# Patient Record
Sex: FEMALE | Race: WHITE | Employment: UNEMPLOYED | URBAN - METROPOLITAN AREA
[De-identification: names, ages, dates, MRNs, and addresses within clinical notes are randomized per-mention and may not be internally consistent; named-entity substitution may affect disease eponyms.]

---

## 2017-02-17 ENCOUNTER — OFFICE VISIT (OUTPATIENT)
Dept: FAMILY MEDICINE CLINIC | Age: 47
End: 2017-02-17

## 2017-02-17 VITALS
BODY MASS INDEX: 23.46 KG/M2 | WEIGHT: 137.4 LBS | TEMPERATURE: 98.5 F | HEART RATE: 56 BPM | HEIGHT: 64 IN | OXYGEN SATURATION: 99 % | DIASTOLIC BLOOD PRESSURE: 68 MMHG | RESPIRATION RATE: 16 BRPM | SYSTOLIC BLOOD PRESSURE: 126 MMHG

## 2017-02-17 DIAGNOSIS — Z00.00 ROUTINE GENERAL MEDICAL EXAMINATION AT A HEALTH CARE FACILITY: Primary | ICD-10-CM

## 2017-02-17 NOTE — PROGRESS NOTES
Subjective:   48 y.o. female for Well Woman Check. Her gyne and breast care is done elsewhere by her Ob-Gyne physician. Patient Active Problem List    Diagnosis Date Noted    Elevated cholesterol with Good HDL     Migraine, unspecified, without mention of intractable migraine without mention of status migrainosus 2010     Current Outpatient Prescriptions   Medication Sig Dispense Refill    mometasone (ELOCON) 0.1 % lotion Put 3 drops in each ear and swab on to outer ear two times a day 30 mL 1    VITAMIN B COMPLEX (B COMPLEX PO) Take  by mouth.  DOCOSAHEXANOIC ACID/EPA (FISH OIL PO) Take 2.5 g by mouth daily.  tiZANidine (ZANAFLEX) 2 mg tablet Take 1-2 Tabs by mouth nightly as needed. For muscle spasms 30 Tab 1    ranitidine (ZANTAC) 150 mg tablet Take 1 Tab by mouth two (2) times daily as needed for Indigestion. 30 Tab 1     Allergies   Allergen Reactions    Zomig [Zolmitriptan] Swelling     Throat stated swelling-heart racing-sx's passed after 30-45 min     Past Medical History   Diagnosis Date    Anemia 2011    Borderline Abnormal thyroid function test 2011    Elevated cholesterol with Good HDL     Migraine     Miscarriage      SABx1     (normal spontaneous vaginal delivery)      NSVDx2     History reviewed. No pertinent past surgical history.   Family History   Problem Relation Age of Onset    Hypertension Mother      obese s/p lap band surgery    High Cholesterol Father     Heart Attack Father      MI at age 52yo, CABG;  in an MVA    High Cholesterol Sister     Cancer Paternal Grandmother      ?intestinal CA in her [de-identified]    Heart Disease Paternal Grandmother      \"aneurysm\" in the brain she thinks    Cancer Paternal Grandfather      throat CA, heavy smoker, ?63's    Thyroid Disease Sister      hypothyroidism    Seizures Daughter      1 seizure at 20mo old   [facility] Breast Cancer Paternal Aunt      Social History   Substance Use Topics    Smoking status: Never Smoker    Smokeless tobacco: Never Used    Alcohol use Yes      Comment: 2-3 times/week about 1-2 glasses of wine           ROS: Feeling generally well. No TIA's or unusual headaches, no dysphagia. No prolonged cough. No dyspnea or chest pain on exertion. No abdominal pain, change in bowel habits, black or bloody stools. No urinary tract symptoms. No new or unusual musculoskeletal symptoms. Specific concerns today: Pt is a  at [employer] and exercises regularly    Objective: The patient appears well, alert, oriented x 3, in no distress. Visit Vitals    /68 (BP 1 Location: Right arm, BP Patient Position: Sitting)    Pulse (!) 56    Temp 98.5 °F (36.9 °C) (Oral)    Resp 16    Ht 5' 4\" (1.626 m)    Wt 137 lb 6.4 oz (62.3 kg)    LMP 01/29/2017 (Approximate)    SpO2 99%    BMI 23.58 kg/m2     ENT normal.  Neck supple. No adenopathy or thyromegaly. DAVI. Lungs are clear, good air entry, no wheezes, rhonchi or rales. S1 and S2 normal, no murmurs, sinus bradycardia. Abdomen soft without tenderness, guarding, mass or organomegaly. Extremities show no edema, normal peripheral pulses. Neurological is normal, no focal findings. Breast and Pelvic exams are deferred. Assessment/Plan:   Well Woman  routine labs ordered    ICD-10-CM ICD-9-CM    1.  Routine general medical examination at a health care facility Z00.00 V70.0 CBC W/O DIFF      LIPID PANEL      METABOLIC PANEL, COMPREHENSIVE      VITAMIN D, 25 HYDROXY      TSH 3RD GENERATION   await labs  Pt was given an after visit summary which includes diagnosis, current medicines and vital and voiced understanding of treatment plan

## 2017-02-17 NOTE — PROGRESS NOTES
Chief Complaint   Patient presents with    Complete Physical     1. Have you been to the ER, urgent care clinic since your last visit? Hospitalized since your last visit? No    2. Have you seen or consulted any other health care providers outside of the [address] [place] [address] since your last visit? Include any pap smears or colon screening.  No

## 2017-02-17 NOTE — MR AVS SNAPSHOT
Visit Information Date & Time Provider Department Dept. Phone Encounter #  
 2/17/2017  9:00 AM J Luis Reyes  [facility] [phone] [id number] Upcoming Health Maintenance Date Due  
 PAP AKA CERVICAL CYTOLOGY 7/15/2018 DTaP/Tdap/Td series (2 - Td) 1/12/2022 Allergies as of 2/17/2017  Review Complete On: 2/17/2017 By: J Luis Reyes NP Severity Noted Reaction Type Reactions Zomig [Zolmitriptan]  04/13/2010    Swelling Throat stated swelling-heart racing-sx's passed after 30-45 min Current Immunizations  Reviewed on 1/12/2012 Name Date TDAP Vaccine 1/12/2012 Not reviewed this visit You Were Diagnosed With   
  
 Codes Comments Routine general medical examination at a health care facility    -  Primary ICD-10-CM: Z00.00 ICD-9-CM: V70.0 Vitals BP Pulse Temp Resp Height(growth percentile) Weight(growth percentile) 126/68 (BP 1 Location: Right arm, BP Patient Position: Sitting) (!) 56 98.5 °F (36.9 °C) (Oral) 16 5' 4\" (1.626 m) 137 lb 6.4 oz (62.3 kg) LMP SpO2 BMI OB Status Smoking Status 01/29/2017 (Approximate) 99% 23.58 kg/m2 Having regular periods Never Smoker Vitals History BMI and BSA Data Body Mass Index Body Surface Area  
 23.58 kg/m 2 1.68 m 2 Preferred Pharmacy Pharmacy Name Phone [id number] [place] [address] [facility] [phone] Your Updated Medication List  
  
   
This list is accurate as of: 2/17/17 10:12 AM.  Always use your most recent med list.  
  
  
  
  
 B COMPLEX PO Take  by mouth. FISH OIL PO Take 2.5 g by mouth daily. mometasone 0.1 % lotion Commonly known as:  Bobbette Promise Put 3 drops in each ear and swab on to outer ear two times a day  
  
 raNITIdine 150 mg tablet Commonly known as:  ZANTAC Take 1 Tab by mouth two (2) times daily as needed for Indigestion. tiZANidine 2 mg tablet Commonly known as:  Moriah Crape Take 1-2 Tabs by mouth nightly as needed. For muscle spasms We Performed the Following CBC W/O DIFF [71726 CPT(R)] LIPID PANEL [44320 CPT(R)] METABOLIC PANEL, COMPREHENSIVE [67408 CPT(R)] TSH 3RD GENERATION [28569 CPT(R)] VITAMIN D, 25 HYDROXY [id number] CPT(R)] Introducing [facility]! Ruth Melissa introduces [employer] patient portal. Now you can access parts of your medical record, email your doctor's office, and request medication refills online. 1. In your internet browser, go to [email or web address] 2. Click on the First Time User? Click Here link in the Sign In box. You will see the New Member Sign Up page. 3. Enter your [employer] Access Code exactly as it appears below. You will not need to use this code after youve completed the sign-up process. If you do not sign up before the expiration date, you must request a new code. · [employer] Access Code: [email or web address] Expires: 5/18/2017 10:12 AM 
 
4. Enter the last four digits of your Social Security Number (xxxx) and Date of Birth (mm/dd/yyyy) as indicated and click Submit. You will be taken to the next sign-up page. 5. Create a [employer] ID. This will be your [employer] login ID and cannot be changed, so think of one that is secure and easy to remember. 6. Create a [employer] password. You can change your password at any time. 7. Enter your Password Reset Question and Answer. This can be used at a later time if you forget your password. 8. Enter your e-mail address. You will receive e-mail notification when new information is available in [address] 9. Click Sign Up. You can now view and download portions of your medical record. 10. Click the Download Summary menu link to download a portable copy of your medical information.  
 
If you have questions, please visit the Frequently Asked Questions section of the RegisterPatient. Remember, [employer] is NOT to be used for urgent needs. For medical emergencies, dial 911. Now available from your iPhone and Android! Please provide this summary of care documentation to your next provider. Your primary care clinician is listed as REGINA HANSON. If you have any questions after today's visit, please call [phone].

## 2017-02-18 LAB
25(OH)D3+25(OH)D2 SERPL-MCNC: 34.4 NG/ML (ref 30–100)
ALBUMIN SERPL-MCNC: 4.4 G/DL (ref 3.5–5.5)
ALBUMIN/GLOB SERPL: 1.8 {RATIO} (ref 1.1–2.5)
ALP SERPL-CCNC: 45 IU/L (ref 39–117)
ALT SERPL-CCNC: 14 IU/L (ref 0–32)
AST SERPL-CCNC: 20 IU/L (ref 0–40)
BILIRUB SERPL-MCNC: 0.3 MG/DL (ref 0–1.2)
BUN SERPL-MCNC: 15 MG/DL (ref 6–24)
BUN/CREAT SERPL: 16 (ref 9–23)
CALCIUM SERPL-MCNC: 9 MG/DL (ref 8.7–10.2)
CHLORIDE SERPL-SCNC: 102 MMOL/L (ref 96–106)
CHOLEST SERPL-MCNC: 262 MG/DL (ref 100–199)
CO2 SERPL-SCNC: 22 MMOL/L (ref 18–29)
CREAT SERPL-MCNC: 0.95 MG/DL (ref 0.57–1)
ERYTHROCYTE [DISTWIDTH] IN BLOOD BY AUTOMATED COUNT: 15.4 % (ref 12.3–15.4)
GLOBULIN SER CALC-MCNC: 2.4 G/DL (ref 1.5–4.5)
GLUCOSE SERPL-MCNC: 85 MG/DL (ref 65–99)
HCT VFR BLD AUTO: 34.1 % (ref 34–46.6)
HDLC SERPL-MCNC: 90 MG/DL
HGB BLD-MCNC: 11.3 G/DL (ref 11.1–15.9)
INTERPRETATION, 910389: NORMAL
LDLC SERPL CALC-MCNC: 158 MG/DL (ref 0–99)
MCH RBC QN AUTO: 27.5 PG (ref 26.6–33)
MCHC RBC AUTO-ENTMCNC: 33.1 G/DL (ref 31.5–35.7)
MCV RBC AUTO: 83 FL (ref 79–97)
PLATELET # BLD AUTO: 272 X10E3/UL (ref 150–379)
POTASSIUM SERPL-SCNC: 4.2 MMOL/L (ref 3.5–5.2)
PROT SERPL-MCNC: 6.8 G/DL (ref 6–8.5)
RBC # BLD AUTO: 4.11 X10E6/UL (ref 3.77–5.28)
SODIUM SERPL-SCNC: 141 MMOL/L (ref 134–144)
TRIGL SERPL-MCNC: 68 MG/DL (ref 0–149)
TSH SERPL DL<=0.005 MIU/L-ACNC: 4.02 UIU/ML (ref 0.45–4.5)
VLDLC SERPL CALC-MCNC: 14 MG/DL (ref 5–40)
WBC # BLD AUTO: 6.1 X10E3/UL (ref 3.4–10.8)

## 2018-02-21 ENCOUNTER — OFFICE VISIT (OUTPATIENT)
Dept: FAMILY MEDICINE CLINIC | Age: 48
End: 2018-02-21

## 2018-02-21 VITALS
SYSTOLIC BLOOD PRESSURE: 112 MMHG | DIASTOLIC BLOOD PRESSURE: 76 MMHG | OXYGEN SATURATION: 98 % | BODY MASS INDEX: 22.26 KG/M2 | WEIGHT: 130.4 LBS | RESPIRATION RATE: 16 BRPM | TEMPERATURE: 99.2 F | HEIGHT: 64 IN | HEART RATE: 84 BPM

## 2018-02-21 DIAGNOSIS — J11.1 INFLUENZA: Primary | ICD-10-CM

## 2018-02-21 LAB
QUICKVUE INFLUENZA TEST: POSITIVE
S PYO AG THROAT QL: NEGATIVE
VALID INTERNAL CONTROL?: YES
VALID INTERNAL CONTROL?: YES

## 2018-02-21 NOTE — PROGRESS NOTES
HISTORY OF PRESENT ILLNESS  Leilani Montalvo is a 50 y.o. female. HPI: Patient reports her symptoms started with chills, fever, sore throat ,cough and body aches x 4 days ago. Her emp was max 101, taking Advil and resting, doesn't have much appetite. Her son is  Diagnosed with flu. Patient doesn't want Tamiflu. Past Medical History:   Diagnosis Date    Anemia 2011    Borderline Abnormal thyroid function test 2011    Elevated cholesterol with Good HDL     Migraine 2007    Miscarriage     SABx1     (normal spontaneous vaginal delivery)     NSVDx2     Allergies   Allergen Reactions    Zomig [Zolmitriptan] Swelling     Throat stated swelling-heart racing-sx's passed after 30-45 min       Current Outpatient Prescriptions:     mometasone (ELOCON) 0.1 % lotion, Put 3 drops in each ear and swab on to outer ear two times a day, Disp: 30 mL, Rfl: 1    VITAMIN B COMPLEX (B COMPLEX PO), Take  by mouth., Disp: , Rfl:     DOCOSAHEXANOIC ACID/EPA (FISH OIL PO), Take 2.5 g by mouth daily. , Disp: , Rfl:     tiZANidine (ZANAFLEX) 2 mg tablet, Take 1-2 Tabs by mouth nightly as needed. For muscle spasms, Disp: 30 Tab, Rfl: 1    ranitidine (ZANTAC) 150 mg tablet, Take 1 Tab by mouth two (2) times daily as needed for Indigestion. , Disp: 30 Tab, Rfl: 1  Review of Systems   Constitutional: Positive for chills, fever and malaise/fatigue. HENT: Negative. Respiratory: Positive for cough. Cardiovascular: Negative. Gastrointestinal: Negative. Blood pressure 112/76, pulse 84, temperature 99.2 °F (37.3 °C), temperature source Oral, resp. rate 16, height 5' 4\" (1.626 m), weight 130 lb 6.4 oz (59.1 kg), last menstrual period 2018, SpO2 98 %. Physical Exam   Constitutional: No distress. HENT:   Mouth/Throat: Oropharynx is clear and moist.   Strep test is negative   Neck: Normal range of motion. Neck supple. Cardiovascular: Normal rate and regular rhythm. No murmur heard.   Pulmonary/Chest: Effort normal and breath sounds normal.   Flu test is positive   Abdominal: Soft. Bowel sounds are normal.   Nursing note and vitals reviewed. ASSESSMENT and PLAN  Diagnoses and all orders for this visit:    1.  Influenza  -     AMB POC RAPID INFLUENZA TEST  -     AMB POC RAPID STREP A  Advised to continue with Advil, tylenol  Rest, fluid, OTC cough medication  Call if not improved  Pt was given an after visit summary which includes diagnosis, current medicines and vital and voiced understanding of treatment plan

## 2018-02-21 NOTE — LETTER
NOTIFICATION RETURN TO WORK / SCHOOL 
 
2/21/2018 11:46 AM 
 
Ms. Kanwal Fung [address] [place] [address] [phone] To Whom It May Concern: 
 
Kanwal Fung is currently under the care of [facility]. She will return to work on 2/26/2018 If there are questions or concerns please have the patient contact our office. Sincerely, Pepe Llanes NP

## 2018-02-21 NOTE — PATIENT INSTRUCTIONS

## 2018-02-21 NOTE — MR AVS SNAPSHOT
[facility] 
 
 
 [address] 
[phone] Patient: Makenzie Segundo MRN: [id number]:[id number] Visit Information Date & Time Provider Department Dept. Phone Encounter #  
 2/21/2018 11:15 AM Sonali Michaels, [facility] [phone] [id number] Upcoming Health Maintenance Date Due  
 PAP AKA CERVICAL CYTOLOGY 7/15/2018 DTaP/Tdap/Td series (2 - Td) 1/12/2022 Allergies as of 2/21/2018  Review Complete On: 2/21/2018 By: Sonali Michaels NP Severity Noted Reaction Type Reactions Zomig [Zolmitriptan]  04/13/2010    Swelling Throat stated swelling-heart racing-sx's passed after 30-45 min Current Immunizations  Reviewed on 1/12/2012 Name Date TDAP Vaccine 1/12/2012 Not reviewed this visit You Were Diagnosed With   
  
 Codes Comments Flu-like symptoms    -  Primary ICD-10-CM: R68.89 ICD-9-CM: 780.99 Influenza     ICD-10-CM: J11.1 ICD-9-CM: 487. 1 Vitals BP Pulse Temp Resp Height(growth percentile) Weight(growth percentile) 112/76 (BP 1 Location: Left arm, BP Patient Position: Sitting) 84 99.2 °F (37.3 °C) (Oral) 16 5' 4\" (1.626 m) 130 lb 6.4 oz (59.1 kg) LMP SpO2 BMI OB Status Smoking Status 02/07/2018 98% 22.38 kg/m2 Having regular periods Never Smoker Vitals History BMI and BSA Data Body Mass Index Body Surface Area  
 22.38 kg/m 2 1.63 m 2 Preferred Pharmacy Pharmacy Name Phone [id number] [place] [address] [facility] [phone] Your Updated Medication List  
  
   
This list is accurate as of 2/21/18 11:50 AM.  Always use your most recent med list.  
  
  
  
  
 B COMPLEX PO Take  by mouth. FISH OIL PO Take 2.5 g by mouth daily. mometasone 0.1 % lotion Commonly known as:  Daphne Raygoza  
 Put 3 drops in each ear and swab on to outer ear two times a day  
  
 raNITIdine 150 mg tablet Commonly known as:  ZANTAC Take 1 Tab by mouth two (2) times daily as needed for Indigestion. tiZANidine 2 mg tablet Commonly known as:  Carter Grew Take 1-2 Tabs by mouth nightly as needed. For muscle spasms We Performed the Following AMB POC RAPID INFLUENZA TEST [21631 CPT(R)] AMB POC RAPID STREP A [06719 CPT(R)] Patient Instructions Influenza (Flu): Care Instructions Your Care Instructions Influenza (flu) is an infection in the lungs and breathing passages. It is caused by the influenza virus. There are different strains, or types, of the flu virus from year to year. Unlike the common cold, the flu comes on suddenly and the symptoms, such as a cough, congestion, fever, chills, fatigue, aches, and pains, are more severe. These symptoms may last up to 10 days. Although the flu can make you feel very sick, it usually doesn't cause serious health problems. Home treatment is usually all you need for flu symptoms. But your doctor may prescribe antiviral medicine to prevent other health problems, such as pneumonia, from developing. Older people and those who have a long-term health condition, such as lung disease, are most at risk for having pneumonia or other health problems. Follow-up care is a key part of your treatment and safety. Be sure to make and go to all appointments, and call your doctor if you are having problems. It's also a good idea to know your test results and keep a list of the medicines you take. How can you care for yourself at home? · Get plenty of rest. 
· Drink plenty of fluids, enough so that your urine is light yellow or clear like water. If you have kidney, heart, or liver disease and have to limit fluids, talk with your doctor before you increase the amount of fluids you drink. · Take an over-the-counter pain medicine if needed, such as acetaminophen (Tylenol), ibuprofen (Advil, Motrin), or naproxen (Aleve), to relieve fever, headache, and muscle aches. Read and follow all instructions on the label. No one younger than 20 should take aspirin. It has been linked to Reye syndrome, a serious illness. · Do not smoke. Smoking can make the flu worse. If you need help quitting, talk to your doctor about stop-smoking programs and medicines. These can increase your chances of quitting for good. · Breathe moist air from a hot shower or from a sink filled with hot water to help clear a stuffy nose. · Before you use cough and cold medicines, check the label. These medicines may not be safe for young children or for people with certain health problems. · If the skin around your nose and lips becomes sore, put some petroleum jelly on the area. · To ease coughing: ¨ Drink fluids to soothe a scratchy throat. ¨ Suck on cough drops or plain hard candy. ¨ Take an over-the-counter cough medicine that contains dextromethorphan to help you get some sleep. Read and follow all instructions on the label. ¨ Raise your head at night with an extra pillow. This may help you rest if coughing keeps you awake. · Take any prescribed medicine exactly as directed. Call your doctor if you think you are having a problem with your medicine. To avoid spreading the flu · Wash your hands regularly, and keep your hands away from your face. · Stay home from school, work, and other public places until you are feeling better and your fever has been gone for at least 24 hours. The fever needs to have gone away on its own without the help of medicine. · Ask people living with you to talk to their doctors about preventing the flu. They may get antiviral medicine to keep from getting the flu from you. · To prevent the flu in the future, get a flu vaccine every fall. Encourage people living with you to get the vaccine. · Cover your mouth when you cough or sneeze. When should you call for help? Call 911 anytime you think you may need emergency care. For example, call if: 
? · You have severe trouble breathing. ?Call your doctor now or seek immediate medical care if: 
? · You have new or worse trouble breathing. ? · You seem to be getting much sicker. ? · You feel very sleepy or confused. ? · You have a new or higher fever. ? · You get a new rash. ? Watch closely for changes in your health, and be sure to contact your doctor if: 
? · You begin to get better and then get worse. ? · You are not getting better after 1 week. Where can you learn more? Go to http://elaine-radha.info/. Enter G572 in the search box to learn more about \"Influenza (Flu): Care Instructions. \" Current as of: May 12, 2017 Content Version: 11.4 © 4680-8034 [employer]. Care instructions adapted under license by [employer] (which disclaims liability or warranty for this information). If you have questions about a medical condition or this instruction, always ask your healthcare professional. [place] [address] any warranty or liability for your use of this information. Introducing [facility]! Sierra Emanuel introduces [employer] patient portal. Now you can access parts of your medical record, email your doctor's office, and request medication refills online. 1. In your internet browser, go to [email or web address] 2. Click on the First Time User? Click Here link in the Sign In box. You will see the New Member Sign Up page. 3. Enter your [employer] Access Code exactly as it appears below. You will not need to use this code after youve completed the sign-up process. If you do not sign up before the expiration date, you must request a new code. · [employer] Access Code: [email or web address] Expires: 5/22/2018 11:31 AM 
 
 4. Enter the last four digits of your Social Security Number (xxxx) and Date of Birth (mm/dd/yyyy) as indicated and click Submit. You will be taken to the next sign-up page. 5. Create a [employer] ID. This will be your [employer] login ID and cannot be changed, so think of one that is secure and easy to remember. 6. Create a [employer] password. You can change your password at any time. 7. Enter your Password Reset Question and Answer. This can be used at a later time if you forget your password. 8. Enter your e-mail address. You will receive e-mail notification when new information is available in [address] 9. Click Sign Up. You can now view and download portions of your medical record. 10. Click the Download Summary menu link to download a portable copy of your medical information. If you have questions, please visit the Frequently Asked Questions section of the [employer] website. Remember, [employer] is NOT to be used for urgent needs. For medical emergencies, dial 911. Now available from your iPhone and Android! Please provide this summary of care documentation to your next provider. Your primary care clinician is listed as REGINA HANSON. If you have any questions after today's visit, please call [phone].

## 2018-06-25 ENCOUNTER — OFFICE VISIT (OUTPATIENT)
Dept: FAMILY MEDICINE CLINIC | Age: 48
End: 2018-06-25

## 2018-06-25 VITALS
BODY MASS INDEX: 23.39 KG/M2 | OXYGEN SATURATION: 99 % | HEIGHT: 64 IN | TEMPERATURE: 98.5 F | RESPIRATION RATE: 16 BRPM | HEART RATE: 56 BPM | WEIGHT: 137 LBS | DIASTOLIC BLOOD PRESSURE: 81 MMHG | SYSTOLIC BLOOD PRESSURE: 108 MMHG

## 2018-06-25 DIAGNOSIS — R42 DIZZINESS: Primary | ICD-10-CM

## 2018-06-25 DIAGNOSIS — H61.21 IMPACTED CERUMEN OF RIGHT EAR: ICD-10-CM

## 2018-06-25 DIAGNOSIS — H61.21 HEARING LOSS DUE TO CERUMEN IMPACTION, RIGHT: ICD-10-CM

## 2018-06-25 RX ORDER — MECLIZINE HYDROCHLORIDE 25 MG/1
25 TABLET ORAL
Qty: 30 TAB | Refills: 0 | Status: SHIPPED | OUTPATIENT
Start: 2018-06-25 | End: 2018-07-05

## 2018-06-25 RX ORDER — METHYLPREDNISOLONE 4 MG/1
TABLET ORAL
Qty: 1 DOSE PACK | Refills: 0 | Status: SHIPPED | OUTPATIENT
Start: 2018-06-25 | End: 2019-05-22 | Stop reason: ALTCHOICE

## 2018-06-25 NOTE — MR AVS SNAPSHOT
[address] 
[phone] Patient: Betito Blackwood MRN: [id number] OCD:4/3/3694 Visit Information Date & Time Provider Department Dept. Phone Encounter #  
 6/25/2018 11:45 AM Ermias Dumas, [address] [facility] [phone] [id number] Upcoming Health Maintenance Date Due  
 PAP AKA CERVICAL CYTOLOGY 7/15/2018 Influenza Age 5 to Adult 8/1/2018 DTaP/Tdap/Td series (2 - Td) 1/12/2022 Allergies as of 6/25/2018  Review Complete On: 6/25/2018 By: Ermias Dumas NP Severity Noted Reaction Type Reactions Zomig [Zolmitriptan]  04/13/2010    Swelling Throat stated swelling-heart racing-sx's passed after 30-45 min Current Immunizations  Reviewed on 1/12/2012 Name Date TDAP Vaccine 1/12/2012 Not reviewed this visit You Were Diagnosed With   
  
 Codes Comments Dizziness    -  Primary ICD-10-CM: D95 ICD-9-CM: 780.4 Hearing loss due to cerumen impaction, right     ICD-10-CM: H61.21 ICD-9-CM: 389.8, 380.4 Vitals BP Pulse Temp Resp Height(growth percentile) Weight(growth percentile) 98/62 (!) 56 98.5 °F (36.9 °C) (Oral) 16 5' 4\" (1.626 m) 137 lb (62.1 kg) LMP SpO2 BMI OB Status Smoking Status 06/02/2018 99% 23.52 kg/m2 Having regular periods Never Smoker Vitals History BMI and BSA Data Body Mass Index Body Surface Area  
 23.52 kg/m 2 1.67 m 2 Preferred Pharmacy Pharmacy Name Phone [id number] [place], [address] [facility] [phone] Your Updated Medication List  
  
   
This list is accurate as of 6/25/18 12:27 PM.  Always use your most recent med list.  
  
  
  
  
 B COMPLEX PO Take  by mouth. FISH OIL PO Take 2.5 g by mouth daily. meclizine 25 mg tablet Commonly known as:  ANTIVERT  
 Take 1 Tab by mouth three (3) times daily as needed for up to 10 days. methylPREDNISolone 4 mg tablet Commonly known as:  Champ Milton Use as directed  
  
 mometasone 0.1 % lotion Commonly known as:  Brendolyjuan Arbour Put 3 drops in each ear and swab on to outer ear two times a day  
  
 raNITIdine 150 mg tablet Commonly known as:  ZANTAC Take 1 Tab by mouth two (2) times daily as needed for Indigestion. tiZANidine 2 mg tablet Commonly known as:  Ibrahim Sayres Take 1-2 Tabs by mouth nightly as needed. For muscle spasms Prescriptions Sent to Pharmacy Refills  
 methylPREDNISolone (MEDROL DOSEPACK) 4 mg tablet 0 Sig: Use as directed Class: Normal  
 Pharmacy: [id number] [place] [id number] [facility]: [phone]  
 meclizine (ANTIVERT) 25 mg tablet 0 Sig: Take 1 Tab by mouth three (3) times daily as needed for up to 10 days. Class: Normal  
 Pharmacy: [id number]- [place] [address] [address] [facility] Ph #: [phone] Route: Oral  
  
We Performed the Following REMOVE IMPACTED EAR WAX [79824 CPT(R)] Introducing [facility] SERVICES! Tariq Correia introduces [employer] patient portal. Now you can access parts of your medical record, email your doctor's office, and request medication refills online. 1. In your internet browser, go to [email or web address]. [email or web address]/[employer] 2. Click on the First Time User? Click Here link in the Sign In box. You will see the New Member Sign Up page. 3. Enter your [employer] Access Code exactly as it appears below. You will not need to use this code after youve completed the sign-up process. If you do not sign up before the expiration date, you must request a new code. · [employer] Access Code: [email or web address] Expires: 9/23/2018 11:51 AM 
 
4.  Enter the last four digits of your Social Security Number (xxxx) and Date of Birth (mm/dd/yyyy) as indicated and click Submit. You will be taken to the next sign-up page. 5. Create a [employer] ID. This will be your [employer] login ID and cannot be changed, so think of one that is secure and easy to remember. 6. Create a [employer] password. You can change your password at any time. 7. Enter your Password Reset Question and Answer. This can be used at a later time if you forget your password. 8. Enter your e-mail address. You will receive e-mail notification when new information is available in [address] 9. Click Sign Up. You can now view and download portions of your medical record. 10. Click the Download Summary menu link to download a portable copy of your medical information. If you have questions, please visit the Frequently Asked Questions section of the [employer] website. Remember, [employer] is NOT to be used for urgent needs. For medical emergencies, dial 911. Now available from your iPhone and Android! Please provide this summary of care documentation to your next provider. Your primary care clinician is listed as REGINA HANSON. If you have any questions after today's visit, please call [phone].

## 2018-06-25 NOTE — PROGRESS NOTES
Chief Complaint   Patient presents with    Hearing Problem     right ear feels totally clogged.  Dizziness     Since 2/2018     1. Have you been to the ER, urgent care clinic since your last visit? Hospitalized since your last visit? No    2. Have you seen or consulted any other health care providers outside of the [facility] since your last visit? Include any pap smears or colon screening.  No

## 2018-06-25 NOTE — PROGRESS NOTES
HISTORY OF PRESENT ILLNESS  Alannah Kelsey is a 50 y.o. female. HPI: Patient complaints of dizziness, and fullness in ears since last feb, after having flu. dizziness is worse with changing position. She is unable to hear for right ear. Denies URI  Past Medical History:   Diagnosis Date    Anemia 2011    Borderline Abnormal thyroid function test 2011    Elevated cholesterol with Good HDL     Migraine 2007    Miscarriage     SABx1     (normal spontaneous vaginal delivery)     NSVDx2   History reviewed. No pertinent surgical history. Allergies   Allergen Reactions    Zomig [Zolmitriptan] Swelling     Throat stated swelling-heart racing-sx's passed after 30-45 min     Current Outpatient Prescriptions:     methylPREDNISolone (MEDROL DOSEPACK) 4 mg tablet, Use as directed, Disp: 1 Dose Pack, Rfl: 0    meclizine (ANTIVERT) 25 mg tablet, Take 1 Tab by mouth three (3) times daily as needed for up to 10 days. , Disp: 30 Tab, Rfl: 0    mometasone (ELOCON) 0.1 % lotion, Put 3 drops in each ear and swab on to outer ear two times a day, Disp: 30 mL, Rfl: 1    VITAMIN B COMPLEX (B COMPLEX PO), Take  by mouth., Disp: , Rfl:     DOCOSAHEXANOIC ACID/EPA (FISH OIL PO), Take 2.5 g by mouth daily. , Disp: , Rfl:     tiZANidine (ZANAFLEX) 2 mg tablet, Take 1-2 Tabs by mouth nightly as needed. For muscle spasms, Disp: 30 Tab, Rfl: 1    ranitidine (ZANTAC) 150 mg tablet, Take 1 Tab by mouth two (2) times daily as needed for Indigestion. , Disp: 30 Tab, Rfl: 1  Review of Systems   Constitutional: Negative. HENT: Positive for hearing loss. Respiratory: Negative. Cardiovascular: Negative. Gastrointestinal: Negative. Neurological: Positive for dizziness. Blood pressure 108/81, pulse (!) 56, temperature 98.5 °F (36.9 °C), temperature source Oral, resp. rate 16, height 5' 4\" (1.626 m), weight 137 lb (62.1 kg), last menstrual period 2018, SpO2 99 %.     Physical Exam   Constitutional: No distress. HENT:   Right ear cerumen impaction  Lt left is dull, no LR   Neck: Normal range of motion. Neck supple. Cardiovascular: Normal rate and regular rhythm. No murmur heard. Pulmonary/Chest: Effort normal and breath sounds normal.   Abdominal: Soft. Bowel sounds are normal.   Nursing note and vitals reviewed. ASSESSMENT and PLAN  Diagnoses and all orders for this visit:    1. Dizziness  -     methylPREDNISolone (MEDROL DOSEPACK) 4 mg tablet; Use as directed  -     meclizine (ANTIVERT) 25 mg tablet; Take 1 Tab by mouth three (3) times daily as needed for up to 10 days. 2. Hearing loss due to cerumen impaction, right       Able to ear post irrigation  3.  Impacted cerumen of right ear  -     REMOVE IMPACTED EAR WAX  Follow up if not improved  Pt was given an after visit summary which includes diagnosis, current medicines and vital and voiced understanding of treatment plan

## 2019-05-22 ENCOUNTER — OFFICE VISIT (OUTPATIENT)
Dept: FAMILY MEDICINE CLINIC | Age: 49
End: 2019-05-22

## 2019-05-22 VITALS
TEMPERATURE: 98.5 F | OXYGEN SATURATION: 100 % | WEIGHT: 136 LBS | HEART RATE: 56 BPM | BODY MASS INDEX: 23.22 KG/M2 | HEIGHT: 64 IN | DIASTOLIC BLOOD PRESSURE: 74 MMHG | SYSTOLIC BLOOD PRESSURE: 122 MMHG | RESPIRATION RATE: 16 BRPM

## 2019-05-22 DIAGNOSIS — H61.21 HEARING LOSS DUE TO CERUMEN IMPACTION, RIGHT: Primary | ICD-10-CM

## 2019-05-22 DIAGNOSIS — N92.6 IRREGULAR PERIODS: ICD-10-CM

## 2019-05-22 NOTE — PROGRESS NOTES
HISTORY OF PRESENT ILLNESS  [place] Marie is a 46 y.o. female. HPI: Patient is complaining right ear fullness with hearing loss that started gradually. Denies pain or dizzness. She has irregular periods, believes that she is going through menopause. Past Medical History:   Diagnosis Date    Anemia 2011    Borderline Abnormal thyroid function test 2011    Elevated cholesterol with Good HDL     Migraine 2007    Miscarriage     SABx1     (normal spontaneous vaginal delivery)     NSVDx2   History reviewed. No pertinent surgical history. Allergies   Allergen Reactions    Zomig [Zolmitriptan] Swelling     Throat stated swelling-heart racing-sx's passed after 30-45 min     Current Outpatient Medications:     DOCOSAHEXANOIC ACID/EPA (FISH OIL PO), Take 2.5 g by mouth daily. , Disp: , Rfl:     tiZANidine (ZANAFLEX) 2 mg tablet, Take 1-2 Tabs by mouth nightly as needed. For muscle spasms, Disp: 30 Tab, Rfl: 1    ranitidine (ZANTAC) 150 mg tablet, Take 1 Tab by mouth two (2) times daily as needed for Indigestion. , Disp: 30 Tab, Rfl: 1    mometasone (ELOCON) 0.1 % lotion, Put 3 drops in each ear and swab on to outer ear two times a day, Disp: 30 mL, Rfl: 1    VITAMIN B COMPLEX (B COMPLEX PO), Take  by mouth., Disp: , Rfl:   Review of Systems   Constitutional: Negative. HENT: Positive for hearing loss. Negative for nosebleeds. Respiratory: Negative. Cardiovascular: Negative. Gastrointestinal: Negative. Blood pressure 122/74, pulse (!) 56, temperature 98.5 °F (36.9 °C), temperature source Oral, resp. rate 16, height 5' 4\" (1.626 m), weight 136 lb (61.7 kg), last menstrual period 05/10/2019, SpO2 100 %. Physical Exam   Constitutional: No distress. HENT:   Left Ear: External ear normal.   Mouth/Throat: Oropharynx is clear and moist.   Right ear partial cerumen impaction   Neck: Normal range of motion. Neck supple. Cardiovascular: Normal rate and regular rhythm.    No murmur heard. Pulmonary/Chest: Effort normal and breath sounds normal.   Abdominal: Soft. Bowel sounds are normal.   Nursing note and vitals reviewed. ASSESSMENT and PLAN  Diagnoses and all orders for this visit:    1. Hearing loss due to cerumen impaction, right  -     REMOVE IMPACTED EAR WAX,         Irrigation didn'tnot helped much with hearing loss  -     REFERRAL TO ENT-OTOLARYNGOLOGY          2.  Irregular periods      Advised she will postmenopause if not having periods for a year  Follow up for physical

## 2019-05-22 NOTE — PROGRESS NOTES
Chief Complaint   Patient presents with    Ear Fullness     pt states her right ear needs looked at.   decreased hearing for over a year.  Irregular Menses     pt wants to discuss menopause symptoms. \"REVIEWED RECORD IN PREPARATION FOR VISIT AND HAVE OBTAINED THE NECESSARY DOCUMENTATION\"  1. Have you been to the ER, urgent care clinic since your last visit? Hospitalized since your last visit? No    2. Have you seen or consulted any other health care providers outside of the [address] [place] [address] since your last visit? Include any pap smears or colon screening.  No

## 2020-06-17 ENCOUNTER — OFFICE VISIT (OUTPATIENT)
Dept: PRIMARY CARE CLINIC | Age: 50
End: 2020-06-17

## 2020-06-17 VITALS — OXYGEN SATURATION: 99 % | HEART RATE: 53 BPM | TEMPERATURE: 98.4 F

## 2020-06-17 DIAGNOSIS — R07.89 CHEST TIGHTNESS: Primary | ICD-10-CM

## 2020-06-17 DIAGNOSIS — Z20.822 EXPOSURE TO COVID-19 VIRUS: ICD-10-CM

## 2020-06-17 NOTE — PROGRESS NOTES
Patient is being seen at the [address] [place] [address]. Please see scanned documentation as well for further information. Patient consented for treatment. S:  Ms. Ida Rivas presents for Covid testing. Patient reports current Covid type symptoms. Tightness in chest and Post nasal drip/ clearing mucous from throat x 2 days. Daughter had direct exposure with positive covid 23 person and has had fever this weekend- daughter's test from [facility] is pending. Patient helped move her daughter home from college Friday. Patient denies additional symptoms. O:    Visit Vitals  Pulse (!) 53   Temp 98.4 °F (36.9 °C) (Oral)   SpO2 99%     Alert and oriented  No acute distress, no increased work of breathing  Normocephalic, atraumatic  Skin color normal  Calm and cooperative  Voice clear, conversant without shortness of breath  Conjunctiva normal  Lungs cta bilat  Heart RRR. A/P:  Concern for Covid 19      Covid 19 testing performed  Patient understands she will be contacted with the results. Supportive care, isolation and follow up prn with primary care provider discussed.

## 2020-06-19 LAB — SARS-COV-2, NAA: NOT DETECTED

## 2020-08-11 ENCOUNTER — VIRTUAL VISIT (OUTPATIENT)
Dept: FAMILY MEDICINE CLINIC | Age: 50
End: 2020-08-11
Payer: COMMERCIAL

## 2020-08-11 DIAGNOSIS — D50.8 OTHER IRON DEFICIENCY ANEMIA: ICD-10-CM

## 2020-08-11 DIAGNOSIS — G43.011 INTRACTABLE MIGRAINE WITHOUT AURA AND WITH STATUS MIGRAINOSUS: Primary | ICD-10-CM

## 2020-08-11 PROCEDURE — 99212 OFFICE O/P EST SF 10 MIN: CPT | Performed by: NURSE PRACTITIONER

## 2020-08-11 RX ORDER — BUTALBITAL, ACETAMINOPHEN AND CAFFEINE 50; 325; 40 MG/1; MG/1; MG/1
1 TABLET ORAL
Qty: 30 TAB | Refills: 0 | Status: SHIPPED | OUTPATIENT
Start: 2020-08-11 | End: 2020-09-29 | Stop reason: SDUPTHER

## 2020-08-11 NOTE — PROGRESS NOTES
Vivian Jeffers  44 y.o. female  1970  [address]  [id number]     [facility]       Encounter Date: 8/11/2020           Established Patient Visit Note: Hilary Stephenson NP    Reason for Appointment:  Chief Complaint   Patient presents with    Cholesterol Problem    Migraine    Other     to discuss labs done        History of Present Illness:  History provided by patient    Vivian Jeffers is a 44 y.o. female who presents today for migraine headaches. The pain started from behind left eye and spread all over her face and head. head . Denies nausea vomiting. She has tired tylenol, Excedrin migraine without help. She did lab work out side and her iron was low       Review of Systems  Review of Systems   Constitutional: Negative. HENT: Negative. Respiratory: Negative. Cardiovascular: Negative. Gastrointestinal: Negative. Neurological: Positive for headaches. Allergies: Zomig [zolmitriptan]    Medications: (Updated to reflect final medication list after visit)    Current Outpatient Medications:     cholecalciferol, vitamin D3, (VITAMIN D3 PO), Take  by mouth., Disp: , Rfl:     butalbital-acetaminophen-caffeine (FIORICET, ESGIC) -40 mg per tablet, Take 1 Tab by mouth every four (4) hours as needed for Headache., Disp: 30 Tab, Rfl: 0    ferrous sulfate (SLOW FE) 142 mg (45 mg iron) ER tablet, Take 1 tab po daily, Disp: 30 Tab, Rfl: 1    ranitidine (ZANTAC) 150 mg tablet, Take 1 Tab by mouth two (2) times daily as needed for Indigestion. , Disp: 30 Tab, Rfl: 1    mometasone (ELOCON) 0.1 % lotion, Put 3 drops in each ear and swab on to outer ear two times a day, Disp: 30 mL, Rfl: 1    tiZANidine (ZANAFLEX) 2 mg tablet, Take 1-2 Tabs by mouth nightly as needed.  For muscle spasms, Disp: 30 Tab, Rfl: 1    VITAMIN B COMPLEX (B COMPLEX PO), Take  by mouth., Disp: , Rfl:     DOCOSAHEXANOIC ACID/EPA (FISH OIL PO), Take 2.5 g by mouth daily. , Disp: , Rfl:     History  Patient Care Team:  Linda Byrd MD as PCP - General  Wynetta Ganser, NP as PCP - [facility]    Past Medical History: she has a past medical history of Anemia (2011), Borderline Abnormal thyroid function test (2011), Elevated cholesterol with Good HDL, Migraine (), Miscarriage, and  (normal spontaneous vaginal delivery). Past Surgical History: she has no past surgical history on file. Family Medical History: family history includes Breast Cancer in her paternal aunt; Cancer in her paternal grandfather and paternal grandmother; Heart Attack in her father; Heart Disease in her paternal grandmother; High Cholesterol in her father and sister; Hypertension in her mother; Seizures in her daughter; Thyroid Disease in her sister. Social History: she reports that she has never smoked. She has never used smokeless tobacco. She reports current alcohol use. She reports that she does not use drugs. GYN: Dr Kaci Montilla      Objective:   Vital Signs  Unable to obtain vital signs today as patient does not have equipment for this at home    Physical Exam  Constitutional:       Appearance: Normal appearance. HENT:      Nose: Nose normal.      Mouth/Throat:      Mouth: Mucous membranes are moist.   Neck:      Musculoskeletal: Normal range of motion and neck supple. Cardiovascular:      Rate and Rhythm: Normal rate and regular rhythm. Pulses: Normal pulses. Heart sounds: Normal heart sounds. No murmur. Pulmonary:      Effort: Pulmonary effort is normal.      Breath sounds: Normal breath sounds. Abdominal:      General: Bowel sounds are normal.      Palpations: Abdomen is soft. Neurological:      Mental Status: She is alert. Assessment & Plan:    1. Intractable migraine without aura and with status migrainosus    - butalbital-acetaminophen-caffeine (FIORICET, ESGIC) -40 mg per tablet;  Take 1 Tab by mouth every four (4) hours as needed for Headache. Dispense: 30 Tab; Refill: 0    2. Other iron deficiency anemia    - ferrous sulfate (SLOW FE) 142 mg (45 mg iron) ER tablet; Take 1 tab po daily  Dispense: 30 Tab; Refill: 1          I was in the office while conducting this encounter. Consent:  She and/or her healthcare decision maker is aware that this patient-initiated Telehealth encounter is a billable service, with coverage as determined by her insurance carrier. She is aware that she may receive a bill and has provided verbal consent to proceed: Yes    This virtual visit was conducted via [employer]. Pursuant to the emergency declaration under the [facility] waiver authority and the [employer] Act, this Virtual  Visit was conducted to reduce the patient's risk of exposure to COVID-19 and provide continuity of care for an established patient. Services were provided through a video synchronous discussion virtually to substitute for in-person clinic visit. Due to this being a TeleHealth evaluation, many elements of the physical examination are unable to be assessed. Total Time: minutes: 11-20 minutes. I have discussed the diagnosis with the patient and the intended plan as seen in the above orders. The patient has received an after-visit summary along with patient information handout. I have discussed medication side effects and warnings with the patient as well.     Disposition    Follow up for general  medical exam     Ahsan Markham NP

## 2020-08-11 NOTE — PROGRESS NOTES
Chief Complaint   Patient presents with    Cholesterol Problem    Migraine    Other     to discuss labs done      1. Have you been to the ER, urgent care clinic since your last visit? Hospitalized since your last visit? No    2. Have you seen or consulted any other health care providers outside of the [address] [place] [address] since your last visit? Include any pap smears or colon screening.  No

## 2020-08-29 ENCOUNTER — TELEPHONE (OUTPATIENT)
Dept: FAMILY MEDICINE CLINIC | Age: 50
End: 2020-08-29

## 2020-08-29 NOTE — TELEPHONE ENCOUNTER
----- Message from Kelli Frazier sent at 8/25/2020 10:23 AM EDT -----  Regarding: Leidy/Telephone  Contact: [phone]  Caller's first and last name: Ben Ty  Reason for call: Pt requsting call back to reschedule her 8/26/20 in office visit.   Callback required yes/no and why: yes  Best contact number(s): [phone]  Details to clarify the request: n/a

## 2020-09-29 DIAGNOSIS — G43.011 INTRACTABLE MIGRAINE WITHOUT AURA AND WITH STATUS MIGRAINOSUS: ICD-10-CM

## 2020-09-29 RX ORDER — BUTALBITAL, ACETAMINOPHEN AND CAFFEINE 50; 325; 40 MG/1; MG/1; MG/1
1 TABLET ORAL
Qty: 30 TAB | Refills: 0 | Status: SHIPPED | OUTPATIENT
Start: 2020-09-29 | End: 2021-11-08 | Stop reason: SDUPTHER

## 2020-09-29 NOTE — TELEPHONE ENCOUNTER
Chief Complaint   Patient presents with    Medication Refill     Fioricet, Esgic -40 mg tablet      Patient last seen virtually on 8/11/2020 by Surekha Mobley NP.   Sheridan Martin LPN

## 2020-10-04 DIAGNOSIS — D50.8 OTHER IRON DEFICIENCY ANEMIA: ICD-10-CM

## 2020-10-06 RX ORDER — FERROUS SULFATE 137(45) MG
TABLET, EXTENDED RELEASE ORAL
Qty: 60 TAB | Refills: 0 | Status: SHIPPED | OUTPATIENT
Start: 2020-10-06 | End: 2021-05-24 | Stop reason: SDUPTHER

## 2020-11-11 ENCOUNTER — VIRTUAL VISIT (OUTPATIENT)
Dept: FAMILY MEDICINE CLINIC | Age: 50
End: 2020-11-11
Payer: COMMERCIAL

## 2020-11-11 ENCOUNTER — PATIENT MESSAGE (OUTPATIENT)
Dept: FAMILY MEDICINE CLINIC | Age: 50
End: 2020-11-11

## 2020-11-11 DIAGNOSIS — R11.2 NAUSEA AND VOMITING IN ADULT: Primary | ICD-10-CM

## 2020-11-11 DIAGNOSIS — R19.7 DIARRHEA, UNSPECIFIED TYPE: ICD-10-CM

## 2020-11-11 PROCEDURE — 99213 OFFICE O/P EST LOW 20 MIN: CPT | Performed by: NURSE PRACTITIONER

## 2020-11-11 NOTE — PROGRESS NOTES
Chief Complaint   Patient presents with    Dizziness    Nausea     during evening    Diarrhea     1. Have you been to the ER, urgent care clinic since your last visit? Hospitalized since your last visit? No    2. Have you seen or consulted any other health care providers outside of the [address] [place] [address] since your last visit? Include any pap smears or colon screening.  No

## 2020-11-11 NOTE — PROGRESS NOTES
Modesto Champion  44 y.o. female  1970  [address]  [id number]     [facility]       Encounter Date: 11/11/2020           Established Patient Visit Note: Enoch Delgado NP    Reason for Appointment:  Chief Complaint   Patient presents with    Dizziness    Nausea     during evening    Diarrhea       History of Present Illness:  History provided by patient    Modesto Champion is a 44 y.o. female who presents today for nausea, vomiting and diarrhea. She reports that on Monday night she felt dizzy then she had nausea with vomiting and diarrhea. She went to bed and felt  better the next day. On tuesday she only had diarrhea with mild nausea . She took Pepto bismol and ginger tea. Today she denies nausea or vomiting, only had one diarrhea. Denies chills, fever,       Review of Systems  Review of Systems   Constitutional: Negative. HENT: Negative. Respiratory: Negative. Cardiovascular: Negative. Gastrointestinal: Positive for diarrhea, nausea and vomiting. Allergies: Zomig [zolmitriptan]    Medications: (Updated to reflect final medication list after visit)    Current Outpatient Medications:     ferrous sulfate (Slow Fe) 142 mg (45 mg iron) ER tablet, TAKE 1 TABLET BY MOUTH EVERY DAY, Disp: 60 Tab, Rfl: 0    cholecalciferol, vitamin D3, (VITAMIN D3 PO), Take  by mouth., Disp: , Rfl:     butalbital-acetaminophen-caffeine (FIORICET, ESGIC) -40 mg per tablet, Take 1 Tab by mouth every four (4) hours as needed for Headache., Disp: 30 Tab, Rfl: 0    tiZANidine (ZANAFLEX) 2 mg tablet, Take 1-2 Tabs by mouth nightly as needed. For muscle spasms, Disp: 30 Tab, Rfl: 1    ranitidine (ZANTAC) 150 mg tablet, Take 1 Tab by mouth two (2) times daily as needed for Indigestion. , Disp: 30 Tab, Rfl: 1    mometasone (ELOCON) 0.1 % lotion, Put 3 drops in each ear and swab on to outer ear two times a day, Disp: 30 mL, Rfl: 1    VITAMIN B COMPLEX (B COMPLEX PO), Take  by mouth., Disp: , Rfl:     DOCOSAHEXANOIC ACID/EPA (FISH OIL PO), Take 2.5 g by mouth daily. , Disp: , Rfl:     History  Patient Care Team:  [facility]    Past Medical History: she has a past medical history of Anemia (2011), Borderline Abnormal thyroid function test (2011), Elevated cholesterol with Good HDL, Migraine (), Miscarriage, and  (normal spontaneous vaginal delivery). Past Surgical History: she has no past surgical history on file. Family Medical History: family history includes Breast Cancer in her paternal aunt; Cancer in her paternal grandfather and paternal grandmother; Heart Attack in her father; Heart Disease in her paternal grandmother; High Cholesterol in her father and sister; Hypertension in her mother; Seizures in her daughter; Thyroid Disease in her sister. Social History: she reports that she has never smoked. She has never used smokeless tobacco. She reports current alcohol use. She reports that she does not use drugs. GYN: Dr Mary Beach      Objective:   Vital Signs  Unable to obtain vital signs today as patient does not have equipment for this at home    Physical Exam  Constitutional:       Appearance: Normal appearance. She is normal weight. HENT:      Head: Normocephalic. Neck:      Musculoskeletal: Normal range of motion and neck supple. Neurological:      Mental Status: She is alert. Psychiatric:         Mood and Affect: Mood normal.         Thought Content: Thought content normal.         Assessment & Plan:    1. Nausea and vomiting in adult  Drinking Ginger tea    2. Diarrhea, unspecified type  Imodium AD to take 1 tab as needed for diarrhea    Eat rice and yogurt, toast  Drink plenty of room temperature  water       I was in the office while conducting this encounter.     Consent:  She and/or her healthcare decision maker is aware that this patient-initiated Telehealth encounter is a billable service, with coverage as determined by her insurance carrier. She is aware that she may receive a bill and has provided verbal consent to proceed: Yes    This virtual visit was conducted via [employer] me. Pursuant to the emergency declaration under the [facility] waiver authority and the [employer] Act, this Virtual  Visit was conducted to reduce the patient's risk of exposure to COVID-19 and provide continuity of care for an established patient. Services were provided through a video synchronous discussion virtually to substitute for in-person clinic visit. Due to this being a TeleHealth evaluation, many elements of the physical examination are unable to be assessed. Total Time: minutes: 11-20 minutes. I have discussed the diagnosis with the patient and the intended plan as seen in the above orders. The patient has received an after-visit summary along with patient information handout. I have discussed medication side effects and warnings with the patient as well.     Disposition    Follow up for physical    Loco Don NP

## 2020-11-12 ENCOUNTER — TELEPHONE (OUTPATIENT)
Dept: FAMILY MEDICINE CLINIC | Age: 50
End: 2020-11-12

## 2020-11-12 NOTE — TELEPHONE ENCOUNTER
----- Message from [place] sent at 11/12/2020 11:06 AM EST -----  Regarding: Dr. Jeane Crenshaw Message/Vendor Calls    Caller's first and last name: Lyle Florian      Reason for call: Pt symptoms have increased with severe dizziness and nausea. It is now day 4 of her not feeling well.  Requesting a call back to advise pt if she should get a covid test.       Callback required yes/no and why: yes      Best contact number(s):[phone]      Details to clarify the request: [facility]

## 2020-11-12 NOTE — TELEPHONE ENCOUNTER
Spoke with patient , name and  verified. Patient informed me that she is still experiencing dizziness and nausea, advised to go to an urgent care for follow up of symptoms. [facility], [facility], or [facility]. Patient verbalized understanding.   Rojelio Noriega LPN

## 2020-11-12 NOTE — TELEPHONE ENCOUNTER
OUTBOUND CALL to pt advising her to get test for COVID-19 at the [address] [place] [address], or any [facility] or [facility]. Pt would like call back from nurse for clincial advise.     BCB# [phone]

## 2020-11-18 ENCOUNTER — OFFICE VISIT (OUTPATIENT)
Dept: FAMILY MEDICINE CLINIC | Age: 50
End: 2020-11-18
Payer: COMMERCIAL

## 2020-11-18 VITALS
HEIGHT: 64 IN | RESPIRATION RATE: 18 BRPM | HEART RATE: 51 BPM | OXYGEN SATURATION: 100 % | TEMPERATURE: 98 F | DIASTOLIC BLOOD PRESSURE: 66 MMHG | BODY MASS INDEX: 23.73 KG/M2 | SYSTOLIC BLOOD PRESSURE: 113 MMHG | WEIGHT: 139 LBS

## 2020-11-18 DIAGNOSIS — E55.9 VITAMIN D DEFICIENCY: ICD-10-CM

## 2020-11-18 DIAGNOSIS — D50.8 OTHER IRON DEFICIENCY ANEMIA: ICD-10-CM

## 2020-11-18 DIAGNOSIS — Z12.11 ENCOUNTER FOR SCREENING COLONOSCOPY: ICD-10-CM

## 2020-11-18 DIAGNOSIS — Z00.00 GENERAL MEDICAL EXAM: Primary | ICD-10-CM

## 2020-11-18 DIAGNOSIS — Z12.31 VISIT FOR SCREENING MAMMOGRAM: ICD-10-CM

## 2020-11-18 PROCEDURE — 99396 PREV VISIT EST AGE 40-64: CPT | Performed by: NURSE PRACTITIONER

## 2020-11-18 RX ORDER — MECLIZINE HYDROCHLORIDE 25 MG/1
25 TABLET ORAL AS NEEDED
COMMUNITY
Start: 2020-11-12

## 2020-11-18 RX ORDER — ONDANSETRON 4 MG/1
4 TABLET, ORALLY DISINTEGRATING ORAL AS NEEDED
COMMUNITY
Start: 2020-11-12 | End: 2022-02-08

## 2020-11-18 NOTE — PROGRESS NOTES
Chief Complaint   Patient presents with    Complete Physical    Vomiting    Dizziness       1. Have you been to the ER, urgent care clinic since your last visit? Hospitalized since your last visit? Yes When: Nov 12 Where: [facility] Reason for visit: vomiting, dizziness    2. Have you seen or consulted any other health care providers outside of the [address] [place] [address] since your last visit? Include any pap smears or colon screening. No    Abuse Screening Questionnaire 11/18/2020   Do you ever feel afraid of your partner? N   Are you in a relationship with someone who physically or mentally threatens you? N   Is it safe for you to go home?  Y       3 most recent PHQ Screens 11/18/2020   Little interest or pleasure in doing things Not at all   Feeling down, depressed, irritable, or hopeless Not at all   Total Score PHQ 2 0

## 2020-11-18 NOTE — PROGRESS NOTES
Subjective:   44 y.o. female for Well Woman Check. Her gyne and breast care is done elsewhere by her Ob-Gyne physician. Patient Active Problem List   Diagnosis Code    Migraine, unspecified, without mention of intractable migraine without mention of status migrainosus G43.909    Elevated cholesterol with Good HDL E78.00     Patient Active Problem List    Diagnosis Date Noted    Elevated cholesterol with Good HDL     Migraine, unspecified, without mention of intractable migraine without mention of status migrainosus 2010     Current Outpatient Medications   Medication Sig Dispense Refill    butalbital-acetaminophen-caffeine (FIORICET, ESGIC) -40 mg per tablet Take 1 Tab by mouth every four (4) hours as needed for Headache. 30 Tab 0    cholecalciferol, vitamin D3, (VITAMIN D3 PO) Take  by mouth.  meclizine (ANTIVERT) 25 mg tablet Take 25 mg by mouth as needed.  ondansetron (ZOFRAN ODT) 4 mg disintegrating tablet Take 4 mg by mouth as needed.  ferrous sulfate (Slow Fe) 142 mg (45 mg iron) ER tablet TAKE 1 TABLET BY MOUTH EVERY DAY 60 Tab 0    mometasone (ELOCON) 0.1 % lotion Put 3 drops in each ear and swab on to outer ear two times a day 30 mL 1    VITAMIN B COMPLEX (B COMPLEX PO) Take  by mouth.  DOCOSAHEXANOIC ACID/EPA (FISH OIL PO) Take 2.5 g by mouth daily. Allergies   Allergen Reactions    Zomig [Zolmitriptan] Swelling     Throat stated swelling-heart racing-sx's passed after 30-45 min     Past Medical History:   Diagnosis Date    Anemia 2011    Borderline Abnormal thyroid function test 2011    Elevated cholesterol with Good HDL     Migraine 2007    Miscarriage     SABx1     (normal spontaneous vaginal delivery)     NSVDx2    Vertigo      History reviewed. No pertinent surgical history.   Family History   Problem Relation Age of Onset    Hypertension Mother         obese s/p lap band surgery    High Cholesterol Father     Heart Attack Father         MI at age 54yo, CABG;  in an MVA    High Cholesterol Sister     Cancer Paternal Grandmother         ?intestinal CA in her [de-identified]    Heart Disease Paternal Grandmother         \"aneurysm\" in the brain she thinks    Cancer Paternal Grandfather         throat CA, heavy smoker, ?63's    Thyroid Disease Sister         hypothyroidism    Seizures Daughter         1 seizure at 20mo old   Kelli Alexander Breast Cancer Paternal Aunt      Social History     Tobacco Use    Smoking status: Never Smoker    Smokeless tobacco: Never Used   Substance Use Topics    Alcohol use: Yes     Comment: 2-3 times/week about 1-2 glasses of wine          ROS: Feeling generally well. No TIA's or unusual headaches, no dysphagia. No prolonged cough. No dyspnea or chest pain on exertion. No abdominal pain, change in bowel habits, black or bloody stools. No urinary tract symptoms. No new or unusual musculoskeletal symptoms. Pap and mammogram was done by GYN    Specific concerns today: Last , she had nausea, vomiting ,  she felt better, on Wednesday, she had dizziness, went [facility] and she was tested negative for COVID, her labs were normal, she was told she had vertigo and dehydration. Osie Ra she was give Zofran and meclizine . And she took fluid. Today she only has mild dizziness. Objective: The patient appears well, alert, oriented x 3, in no distress. Visit Vitals  /66 (BP 1 Location: Left arm, BP Patient Position: Sitting)   Pulse (!) 51   Temp 98 °F (36.7 °C) (Temporal)   Resp 18   Ht 5' 4\" (1.626 m)   Wt 139 lb (63 kg)   SpO2 100%   BMI 23.86 kg/m²     ENT normal.  Neck supple. No adenopathy or thyromegaly. DAVI. Lungs are clear, good air entry, no wheezes, rhonchi or rales. S1 and S2 normal, no murmurs, regular rate and rhythm. Abdomen soft without tenderness, guarding, mass or organomegaly. Extremities show no edema, normal peripheral pulses.  Neurological is normal, no focal findings. Breast and Pelvic exams are deferred. Assessment/Plan:   Well Woman  continue present plan, routine labs ordered    ICD-10-CM ICD-9-CM    1. General medical exam  Z00.00 V70.9 LIPID PANEL      METABOLIC PANEL, COMPREHENSIVE      CBC W/O DIFF      T4, FREE      TSH 3RD GENERATION   2. Vitamin D deficiency  E55.9 268.9 VITAMIN D, 25 HYDROXY   3. Other iron deficiency anemia  D50.8 280.8    4. Visit for screening mammogram  Z12.31 V76.12    5.  Encounter for screening colonoscopy  Z12.11 V76.51 REFERRAL TO GASTROENTEROLOGY

## 2020-11-19 LAB
25(OH)D3+25(OH)D2 SERPL-MCNC: 28.9 NG/ML (ref 30–100)
ALBUMIN SERPL-MCNC: 4.5 G/DL (ref 3.8–4.9)
ALBUMIN/GLOB SERPL: 1.8 {RATIO} (ref 1.2–2.2)
ALP SERPL-CCNC: 54 IU/L (ref 39–117)
ALT SERPL-CCNC: 12 IU/L (ref 0–32)
AST SERPL-CCNC: 21 IU/L (ref 0–40)
BILIRUB SERPL-MCNC: <0.2 MG/DL (ref 0–1.2)
BUN SERPL-MCNC: 15 MG/DL (ref 6–24)
BUN/CREAT SERPL: 15 (ref 9–23)
CALCIUM SERPL-MCNC: 9.2 MG/DL (ref 8.7–10.2)
CHLORIDE SERPL-SCNC: 102 MMOL/L (ref 96–106)
CHOLEST SERPL-MCNC: 243 MG/DL (ref 100–199)
CO2 SERPL-SCNC: 23 MMOL/L (ref 20–29)
CREAT SERPL-MCNC: 0.97 MG/DL (ref 0.57–1)
ERYTHROCYTE [DISTWIDTH] IN BLOOD BY AUTOMATED COUNT: 16.5 % (ref 11.7–15.4)
GLOBULIN SER CALC-MCNC: 2.5 G/DL (ref 1.5–4.5)
GLUCOSE SERPL-MCNC: 82 MG/DL (ref 65–99)
HCT VFR BLD AUTO: 33.2 % (ref 34–46.6)
HDLC SERPL-MCNC: 75 MG/DL
HGB BLD-MCNC: 9.9 G/DL (ref 11.1–15.9)
INTERPRETATION, 910389: NORMAL
LDLC SERPL CALC-MCNC: 152 MG/DL (ref 0–99)
MCH RBC QN AUTO: 22.7 PG (ref 26.6–33)
MCHC RBC AUTO-ENTMCNC: 29.8 G/DL (ref 31.5–35.7)
MCV RBC AUTO: 76 FL (ref 79–97)
PLATELET # BLD AUTO: 338 X10E3/UL (ref 150–450)
POTASSIUM SERPL-SCNC: 4.4 MMOL/L (ref 3.5–5.2)
PROT SERPL-MCNC: 7 G/DL (ref 6–8.5)
RBC # BLD AUTO: 4.36 X10E6/UL (ref 3.77–5.28)
SODIUM SERPL-SCNC: 138 MMOL/L (ref 134–144)
T4 FREE SERPL-MCNC: 0.98 NG/DL (ref 0.82–1.77)
TRIGL SERPL-MCNC: 91 MG/DL (ref 0–149)
TSH SERPL DL<=0.005 MIU/L-ACNC: 4.16 UIU/ML (ref 0.45–4.5)
VLDLC SERPL CALC-MCNC: 16 MG/DL (ref 5–40)
WBC # BLD AUTO: 6.1 X10E3/UL (ref 3.4–10.8)

## 2021-05-24 DIAGNOSIS — D50.8 OTHER IRON DEFICIENCY ANEMIA: ICD-10-CM

## 2021-05-25 NOTE — TELEPHONE ENCOUNTER
Requested Prescriptions     Pending Prescriptions Disp Refills    ferrous sulfate (Slow Fe) 142 mg (45 mg iron) ER tablet 60 Tablet 0     Sig: TAKE 1 TABLET BY MOUTH EVERY DAY

## 2021-05-26 ENCOUNTER — TELEPHONE (OUTPATIENT)
Dept: FAMILY MEDICINE CLINIC | Age: 51
End: 2021-05-26

## 2021-05-26 RX ORDER — FERROUS SULFATE 137(45) MG
TABLET, EXTENDED RELEASE ORAL
Qty: 60 TABLET | Refills: 0 | Status: SHIPPED | OUTPATIENT
Start: 2021-05-26 | End: 2021-07-17

## 2021-05-26 NOTE — TELEPHONE ENCOUNTER
Called Pt and LVM to call back a schedule an appointment.  ANITA Forbes wants a follow-up visit for CBC and anemia

## 2021-07-09 ENCOUNTER — HOSPITAL ENCOUNTER (EMERGENCY)
Age: 51
Discharge: HOME | End: 2021-07-10
Attending: EMERGENCY MEDICINE
Payer: COMMERCIAL

## 2021-07-09 DIAGNOSIS — R42 VERTIGO: Primary | ICD-10-CM

## 2021-07-09 LAB
ALBUMIN SERPL-MCNC: 3.5 G/DL (ref 3.5–5)
ALBUMIN/GLOB SERPL: 1 {RATIO} (ref 1.1–2.2)
ALP SERPL-CCNC: 46 U/L (ref 45–117)
ALT SERPL-CCNC: 16 U/L (ref 12–78)
ANION GAP SERPL CALC-SCNC: 2 MMOL/L (ref 5–15)
AST SERPL-CCNC: 16 U/L (ref 15–37)
BASOPHILS # BLD: 0.1 K/UL (ref 0–0.1)
BASOPHILS NFR BLD: 1 % (ref 0–1)
BILIRUB SERPL-MCNC: 0.2 MG/DL (ref 0.2–1)
BUN SERPL-MCNC: 9 MG/DL (ref 6–20)
BUN/CREAT SERPL: 10 (ref 12–20)
CALCIUM SERPL-MCNC: 8.9 MG/DL (ref 8.5–10.1)
CHLORIDE SERPL-SCNC: 106 MMOL/L (ref 97–108)
CO2 SERPL-SCNC: 28 MMOL/L (ref 21–32)
COMMENT, HOLDF: NORMAL
CREAT SERPL-MCNC: 0.9 MG/DL (ref 0.55–1.02)
DIFFERENTIAL METHOD BLD: ABNORMAL
EOSINOPHIL # BLD: 0.2 K/UL (ref 0–0.4)
EOSINOPHIL NFR BLD: 3 % (ref 0–7)
ERYTHROCYTE [DISTWIDTH] IN BLOOD BY AUTOMATED COUNT: 21.7 % (ref 11.5–14.5)
GLOBULIN SER CALC-MCNC: 3.4 G/DL (ref 2–4)
GLUCOSE SERPL-MCNC: 112 MG/DL (ref 65–100)
HCT VFR BLD AUTO: 39.2 % (ref 35–47)
HGB BLD-MCNC: 12.8 G/DL (ref 11.5–16)
IMM GRANULOCYTES # BLD AUTO: 0 K/UL (ref 0–0.04)
IMM GRANULOCYTES NFR BLD AUTO: 0 % (ref 0–0.5)
LYMPHOCYTES # BLD: 1.6 K/UL (ref 0.8–3.5)
LYMPHOCYTES NFR BLD: 25 % (ref 12–49)
MCH RBC QN AUTO: 27 PG (ref 26–34)
MCHC RBC AUTO-ENTMCNC: 32.7 G/DL (ref 30–36.5)
MCV RBC AUTO: 82.7 FL (ref 80–99)
MONOCYTES # BLD: 0.4 K/UL (ref 0–1)
MONOCYTES NFR BLD: 6 % (ref 5–13)
NEUTS SEG # BLD: 3.9 K/UL (ref 1.8–8)
NEUTS SEG NFR BLD: 65 % (ref 32–75)
NRBC # BLD: 0 K/UL (ref 0–0.01)
NRBC BLD-RTO: 0 PER 100 WBC
PLATELET # BLD AUTO: 235 K/UL (ref 150–400)
PMV BLD AUTO: 9.4 FL (ref 8.9–12.9)
POTASSIUM SERPL-SCNC: 3.8 MMOL/L (ref 3.5–5.1)
PROT SERPL-MCNC: 6.9 G/DL (ref 6.4–8.2)
RBC # BLD AUTO: 4.74 M/UL (ref 3.8–5.2)
RBC MORPH BLD: ABNORMAL
RBC MORPH BLD: ABNORMAL
SAMPLES BEING HELD,HOLD: NORMAL
SODIUM SERPL-SCNC: 136 MMOL/L (ref 136–145)
TROPONIN I SERPL-MCNC: <0.05 NG/ML
WBC # BLD AUTO: 6.2 K/UL (ref 3.6–11)

## 2021-07-09 PROCEDURE — 80053 COMPREHEN METABOLIC PANEL: CPT

## 2021-07-09 PROCEDURE — 36415 COLL VENOUS BLD VENIPUNCTURE: CPT

## 2021-07-09 PROCEDURE — 84484 ASSAY OF TROPONIN QUANT: CPT

## 2021-07-09 PROCEDURE — 99284 EMERGENCY DEPT VISIT MOD MDM: CPT

## 2021-07-09 PROCEDURE — 85025 COMPLETE CBC W/AUTO DIFF WBC: CPT

## 2021-07-09 PROCEDURE — 93005 ELECTROCARDIOGRAM TRACING: CPT

## 2021-07-09 RX ORDER — DIAZEPAM 5 MG/1
5 TABLET ORAL
Qty: 20 TABLET | Refills: 0 | Status: SHIPPED | OUTPATIENT
Start: 2021-07-09 | End: 2022-02-06

## 2021-07-09 RX ORDER — DIAZEPAM 5 MG/1
5 TABLET ORAL
Status: COMPLETED | OUTPATIENT
Start: 2021-07-09 | End: 2021-07-10

## 2021-07-10 VITALS
SYSTOLIC BLOOD PRESSURE: 145 MMHG | TEMPERATURE: 98 F | OXYGEN SATURATION: 100 % | HEART RATE: 46 BPM | RESPIRATION RATE: 16 BRPM | DIASTOLIC BLOOD PRESSURE: 95 MMHG

## 2021-07-10 LAB
ATRIAL RATE: 47 BPM
CALCULATED P AXIS, ECG09: 77 DEGREES
CALCULATED R AXIS, ECG10: 92 DEGREES
CALCULATED T AXIS, ECG11: 68 DEGREES
DIAGNOSIS, 93000: NORMAL
P-R INTERVAL, ECG05: 158 MS
Q-T INTERVAL, ECG07: 442 MS
QRS DURATION, ECG06: 84 MS
QTC CALCULATION (BEZET), ECG08: 391 MS
VENTRICULAR RATE, ECG03: 47 BPM

## 2021-07-10 PROCEDURE — 74011250637 HC RX REV CODE- 250/637: Performed by: EMERGENCY MEDICINE

## 2021-07-10 RX ADMIN — DIAZEPAM 5 MG: 5 TABLET ORAL at 00:03

## 2021-07-10 NOTE — ED TRIAGE NOTES
Triage: Pt arrives from [facility] where she was seen for dizziness. She has been dizzy for several days. The dizziness has not responded to meclizine. Pt reports when she got to [facility] she was bradycardic. She also reports she has been receiving notifications on her applewatch about a low heart rate. Pt has had a similar issue in the past but her low heart rate has resolved after administration of IV fluids. She was give 1L NS and she remains nato in the 40s.

## 2021-07-10 NOTE — DISCHARGE INSTRUCTIONS
You have a normal sinus bradycardic heart rhythm which is normal in healthy fit individuals. Given a normal blood pressure, this is not the cause of your dizziness. Your dizziness is related to an inner ear issue and needs to be further evaluated by Dr. Olvin Baeza. Please call Monday morning. YOu can continue the Meclizine every 8 hours during the day. You can try the Valium as well- but this will make you drowsy, can't drive, etc.  Use only when you know you will be home for 8 hours. Return to the ED if you feel your symptoms/condition is acutely worsening.

## 2021-07-10 NOTE — ED PROVIDER NOTES
HPI     25-year-old female with a history of anemia, migraines, vertigo, hearing loss on the right, presents emergency department with concerns for a low heart rate. Patient states she went to [facility] today for vertigo. This is the third episode of vertigo she has had since November. She sees Dr. Ferdinand Serrato, ENT, for hearing loss 2 years ago and had a negative MRI at that time. She states this episode started while she was in [place] doing a lot of walking in the heat. She does take meclizine occasionally with some relief. She does get nausea vomiting with episodes no sweating. No focal weakness numbness, headache, vision changes, difficulty with speech, or facial numbness. [facility] and does feel better since she has been here. [facility] was concerned about her bradycardia and sent her here for further evaluation. Patient states she is an  and is very fit and normally has a resting heart rate of around 50. She does not feel lightheaded or dizzy like she is going to pass out. She has no chest pain shortness of breath. She has not seen Dr. Ferdinand Serrato for the vertigo, only the hearing loss. Past Medical History:   Diagnosis Date    Anemia 2011    Borderline Abnormal thyroid function test 2011    Elevated cholesterol with Good HDL     Migraine 2007    Miscarriage     SABx1     (normal spontaneous vaginal delivery)     NSVDx2    Vertigo        No past surgical history on file.       Family History:   Problem Relation Age of Onset    Hypertension Mother         obese s/p lap band surgery    High Cholesterol Father     Heart Attack Father         MI at age 52yo, CABG;  in an MVA    High Cholesterol Sister     Cancer Paternal Grandmother         ?intestinal CA in her [de-identified]    Heart Disease Paternal Grandmother         \"aneurysm\" in the brain she thinks    Cancer Paternal Grandfather         throat CA, heavy smoker, ?63's    Thyroid Disease Sister         hypothyroidism    Seizures Daughter         1 seizure at 20mo old   [address] [place] [address] [place] Breast Cancer Paternal Aunt        Social History     Socioeconomic History    Marital status:      Spouse name: Not on file    Number of children: 2    Years of education: Not on file    Highest education level: Not on file   Occupational History    Occupation:     Occupation:    Tobacco Use    Smoking status: Never Smoker    Smokeless tobacco: Never Used   Substance and Sexual Activity    Alcohol use: Yes     Comment: 2-3 times/week about 1-2 glasses of wine    Drug use: No    Sexual activity: Yes     Partners: Male     Birth control/protection: Condom   Other Topics Concern     Service Not Asked    Blood Transfusions Not Asked    Caffeine Concern Yes     Comment: 1 latte qam    Occupational Exposure Not Asked    Hobby Hazards Not Asked    Sleep Concern Not Asked    Stress Concern Not Asked    Weight Concern No    Special Diet Yes     Comment: low fat, cut out all sugars x 1 yr, lots of veggies    Back Care Not Asked    Exercise Yes     Comment: aerobics/, was doing cardio avg 1-2hrs a day, but now has cut back and doing a lot more yoga    Bike Helmet Not Asked    Seat Belt Not Asked    Self-Exams Not Asked   Social History Narrative    Not on file     Social Determinants of Health     Financial Resource Strain:     Difficulty of Paying Living Expenses:    Food Insecurity:     Worried About Running Out of Food in the Last Year:     [address] in the Last Year:    Transportation Needs:     Lack of Transportation (Medical):      Lack of Transportation (Non-Medical):    Physical Activity:     Days of Exercise per Week:     Minutes of Exercise per Session:    Stress:     Feeling of Stress :    Social Connections:     Frequency of Communication with Friends and Family:     Frequency of Social Gatherings with Friends and Family:     Attends Judaism Services:     Active Member of Clubs or Organizations:     Attends Club or Organization Meetings:     Marital Status:    Intimate Partner Violence:     Fear of Current or Ex-Partner:     Emotionally Abused:     Physically Abused:     Sexually Abused: ALLERGIES: Zomig [zolmitriptan]    Review of Systems   Constitutional: Negative for fever. HENT: Negative for congestion, ear pain and tinnitus. Eyes: Negative for visual disturbance. Respiratory: Negative for cough and shortness of breath. Cardiovascular: Negative for chest pain, palpitations and leg swelling. Gastrointestinal: Positive for nausea and vomiting. Negative for abdominal pain. Genitourinary: Negative for dysuria. Musculoskeletal: Positive for gait problem. Neurological: Positive for dizziness. Negative for facial asymmetry, speech difficulty, weakness, light-headedness, numbness and headaches. Psychiatric/Behavioral: Negative for dysphoric mood. Vitals:    07/09/21 2027   BP: 130/87   Pulse: (!) 46   Resp: 16   Temp: 98 °F (36.7 °C)   SpO2: 98%            Physical Exam  Constitutional:       General: She is not in acute distress. Appearance: She is well-developed. HENT:      Head: Normocephalic and atraumatic. Mouth/Throat:      Pharynx: No oropharyngeal exudate. Eyes:      General: No scleral icterus. Right eye: No discharge. Left eye: No discharge. Pupils: Pupils are equal, round, and reactive to light. Neck:      Vascular: No JVD. Cardiovascular:      Rate and Rhythm: Normal rate and regular rhythm. Heart sounds: Normal heart sounds. No murmur heard. Pulmonary:      Effort: Pulmonary effort is normal. No respiratory distress. Breath sounds: Normal breath sounds. No stridor. No wheezing or rales. Chest:      Chest wall: No tenderness. Abdominal:      General: Bowel sounds are normal. There is no distension.       Palpations: Abdomen is soft. There is no mass. Tenderness: There is no abdominal tenderness. There is no guarding or rebound. Musculoskeletal:         General: Normal range of motion. Cervical back: Normal range of motion and neck supple. Skin:     General: Skin is warm and dry. Capillary Refill: Capillary refill takes less than 2 seconds. Findings: No rash. Neurological:      Mental Status: She is oriented to person, place, and time. Psychiatric:         Behavior: Behavior normal.         Thought Content: Thought content normal.         Judgment: Judgment normal.          MDM       Procedures    ED EKG interpretation:  Rhythm: sinus bradycardia; and regular . Rate (approx.): 47; Axis: normal; P wave: normal; QRS interval: normal ; ST/T wave: non-specific changes; This EKG was interpreted by Dana Rob MD,ED Provider. Labs are reassuring. Blood pressure is normal. EKG shows sinus nato. Neurologic exam is reassuring. Will try Valium for symptom relief of the vertigo. Patient will follow up with Dr. Micah Mcfarlane on Monday. Return precautions provided.

## 2021-07-14 ENCOUNTER — OFFICE VISIT (OUTPATIENT)
Dept: FAMILY MEDICINE CLINIC | Age: 51
End: 2021-07-14
Payer: COMMERCIAL

## 2021-07-14 VITALS
RESPIRATION RATE: 18 BRPM | BODY MASS INDEX: 23.48 KG/M2 | TEMPERATURE: 98.1 F | WEIGHT: 136.8 LBS | SYSTOLIC BLOOD PRESSURE: 112 MMHG | HEART RATE: 64 BPM | DIASTOLIC BLOOD PRESSURE: 74 MMHG | OXYGEN SATURATION: 97 %

## 2021-07-14 DIAGNOSIS — Z12.11 ENCOUNTER FOR SCREENING COLONOSCOPY: ICD-10-CM

## 2021-07-14 DIAGNOSIS — R42 DIZZINESS: Primary | ICD-10-CM

## 2021-07-14 PROCEDURE — 99213 OFFICE O/P EST LOW 20 MIN: CPT | Performed by: NURSE PRACTITIONER

## 2021-07-14 NOTE — PROGRESS NOTES
[address] [facility] Note  Subjective:      Josey Zheng is a 44 y.o. female who presents for follow up from ER for dizziness, patient has history of anemia, migraines, vertigo, hearing loss on the right ear. She went to [facility] for dizziness, her labs were normal, her EKG showed bradycardia,. She was received fluid and felt better and was discharged home. Later she was by [facility] called and was advised to go ER, due to bradycardia. In ER her labs and EKG were repeated. EKG showed bradycardia. Patient informed the staff that she is a  and that her heart rate stays low . Today she states that her dizziness started in [place] after walking in a hot weather. She has been taking meclizine as needed and drinking plenty of water. She appointment with DR Dionte Durán. Due for colonoscopy    Past Medical History:   Diagnosis Date    Anemia 2011    Borderline Abnormal thyroid function test 2011    Elevated cholesterol with Good HDL     Migraine     Miscarriage     SABx1     (normal spontaneous vaginal delivery)     NSVDx2    Vertigo    History reviewed. No pertinent surgical history. Current Outpatient Medications   Medication Sig Dispense Refill    ferrous sulfate (Slow Fe) 142 mg (45 mg iron) ER tablet TAKE 1 TABLET BY MOUTH EVERY DAY 60 Tablet 0    meclizine (ANTIVERT) 25 mg tablet Take 25 mg by mouth as needed.  ondansetron (ZOFRAN ODT) 4 mg disintegrating tablet Take 4 mg by mouth as needed.  butalbital-acetaminophen-caffeine (FIORICET, ESGIC) -40 mg per tablet Take 1 Tab by mouth every four (4) hours as needed for Headache. 30 Tab 0    cholecalciferol, vitamin D3, (VITAMIN D3 PO) Take  by mouth.  diazePAM (Valium) 5 mg tablet Take 1 Tablet by mouth every eight (8) hours as needed (vertigo). Max Daily Amount: 15 mg.  (Patient not taking: Reported on 2021) 20 Tablet 0    mometasone (ELOCON) 0.1 % lotion Put 3 drops in each ear and swab on to outer ear two times a day (Patient not taking: Reported on 7/14/2021) 30 mL 1    VITAMIN B COMPLEX (B COMPLEX PO) Take  by mouth. (Patient not taking: Reported on 7/14/2021)      DOCOSAHEXANOIC ACID/EPA (FISH OIL PO) Take 2.5 g by mouth daily. (Patient not taking: Reported on 7/14/2021)       Allergies   Allergen Reactions    Zomig [Zolmitriptan] Swelling     Throat stated swelling-heart racing-sx's passed after 30-45 min       ROS:   Complete review of systems was reviewed with pertinent information listed in HPI. Review of Systems   Constitutional: Negative. HENT: Negative. Respiratory: Negative. Cardiovascular: Negative. Gastrointestinal: Negative. Genitourinary: Negative. Neurological: Positive for dizziness. Objective:     Visit Vitals  /74 (BP 1 Location: Left upper arm, BP Patient Position: Sitting, BP Cuff Size: Adult)   Pulse 64   Temp 98.1 °F (36.7 °C) (Temporal)   Resp 18   Wt 136 lb 12.8 oz (62.1 kg)   LMP 07/14/2021   SpO2 97%   BMI 23.48 kg/m²       Vitals and Nurse Documentation reviewed. Physical Exam  Constitutional:       Appearance: Normal appearance. HENT:      Mouth/Throat:      Mouth: Mucous membranes are moist.   Cardiovascular:      Pulses: Normal pulses. Heart sounds: Normal heart sounds. No murmur heard. Pulmonary:      Effort: Pulmonary effort is normal.      Breath sounds: Normal breath sounds. Abdominal:      General: Bowel sounds are normal.      Palpations: Abdomen is soft. Musculoskeletal:      Cervical back: Normal range of motion and neck supple. Neurological:      Mental Status: She is alert. Psychiatric:         Mood and Affect: Mood normal.         Thought Content: Thought content normal.         Assessment/Plan:     Diagnoses and all orders for this visit:    1. Dizziness     Continue with meclizine     Increase water intake      Follow up with Dr James Trevizo    2.  Encounter for screening colonoscopy  - REFERRAL TO GASTROENTEROLOGY          Pt expressed understanding with the diagnosis and plan        Discussed expected course/resolution/complications of diagnosis in detail with patient.    Medication risks/benefits/costs/interactions/alternatives discussed with patient.    Pt was given an after visit summary which includes diagnoses, current medications & vitals.  Pt expressed understanding with the diagnosis and plan

## 2021-07-14 NOTE — PROGRESS NOTES
Chief Complaint   Patient presents with    Transitions Of Care     follow up for dizziness       1. Have you been to the ER, urgent care clinic since your last visit? Hospitalized since your last visit? No    2. Have you seen or consulted any other health care providers outside of the [address] [place] [address] since your last visit? Include any pap smears or colon screening.  No    3 most recent PHQ Screens 7/14/2021   Little interest or pleasure in doing things Several days   Feeling down, depressed, irritable, or hopeless Several days   Total Score PHQ 2 2

## 2021-07-17 DIAGNOSIS — D50.8 OTHER IRON DEFICIENCY ANEMIA: ICD-10-CM

## 2021-07-17 RX ORDER — ACETAMINOPHEN 500 MG
TABLET ORAL
Qty: 60 TABLET | Refills: 0 | Status: SHIPPED | OUTPATIENT
Start: 2021-07-17

## 2021-11-08 DIAGNOSIS — G43.011 INTRACTABLE MIGRAINE WITHOUT AURA AND WITH STATUS MIGRAINOSUS: ICD-10-CM

## 2021-11-08 RX ORDER — BUTALBITAL, ACETAMINOPHEN AND CAFFEINE 50; 325; 40 MG/1; MG/1; MG/1
1 TABLET ORAL
Qty: 30 TABLET | Refills: 0 | Status: SHIPPED | OUTPATIENT
Start: 2021-11-08

## 2021-11-08 NOTE — TELEPHONE ENCOUNTER
PCP: Arline Velasco NP    Last appt: 7/14/2021  No future appointments. Requested Prescriptions     Pending Prescriptions Disp Refills    butalbital-acetaminophen-caffeine (FIORICET, ESGIC) -40 mg per tablet 30 Tablet 0     Sig: Take 1 Tablet by mouth every four (4) hours as needed for Headache.        Prior labs and Blood pressures:  BP Readings from Last 3 Encounters:   07/14/21 112/74   07/10/21 (!) 145/95   11/18/20 113/66     Lab Results   Component Value Date/Time    Sodium 136 07/09/2021 09:11 PM    Potassium 3.8 07/09/2021 09:11 PM    Chloride 106 07/09/2021 09:11 PM    CO2 28 07/09/2021 09:11 PM    Anion gap 2 (L) 07/09/2021 09:11 PM    Glucose 112 (H) 07/09/2021 09:11 PM    BUN 9 07/09/2021 09:11 PM    Creatinine 0.90 07/09/2021 09:11 PM    BUN/Creatinine ratio 10 (L) 07/09/2021 09:11 PM    GFR est AA >60 07/09/2021 09:11 PM    GFR est non-AA >60 07/09/2021 09:11 PM    Calcium 8.9 07/09/2021 09:11 PM     No results found for: HBA1C, NCE6DWHM, JLF1ZZQU  Lab Results   Component Value Date/Time    Cholesterol, total 243 (H) 11/18/2020 12:00 AM    HDL Cholesterol 75 11/18/2020 12:00 AM    LDL, calculated 152 (H) 11/18/2020 12:00 AM    LDL, calculated 158 (H) 02/17/2017 10:26 AM    VLDL, calculated 16 11/18/2020 12:00 AM    VLDL, calculated 14 02/17/2017 10:26 AM    Triglyceride 91 11/18/2020 12:00 AM    CHOL/HDL Ratio 2.6 04/15/2010 08:16 AM     Lab Results   Component Value Date/Time    Vitamin D 25-Hydroxy 32.6 01/21/2011 08:31 AM    VITAMIN D, 25-HYDROXY 28.9 (L) 11/18/2020 12:00 AM       Lab Results   Component Value Date/Time    TSH 4.160 11/18/2020 12:00 AM

## 2022-01-27 ENCOUNTER — TELEPHONE (OUTPATIENT)
Dept: FAMILY MEDICINE CLINIC | Age: 52
End: 2022-01-27

## 2022-01-27 NOTE — TELEPHONE ENCOUNTER
----- Message from Jolly Mera sent at 1/27/2022  3:32 PM EST -----  Subject: Message to Provider    QUESTIONS  Information for Provider? pt is asking if we do pcr tests? she is leaving   2/12 for [facility], and needs a test within 72 hours before she goes. with   results . ---------------------------------------------------------------------------  --------------  Vik SCOTT  What is the best way for the office to contact you? OK to leave message on   voicemail  Preferred Call Back Phone Number? [phone]  ---------------------------------------------------------------------------  --------------  SCRIPT ANSWERS  Relationship to Patient?  Self

## 2022-01-27 NOTE — TELEPHONE ENCOUNTER
Chief Complaint   Patient presents with    Testing     Covid PCR testing. Patient inquiring if our office does the testing. Spoke with patient informed that our office does not do the pcr covid testing. Patient had no other questions at time of call.   Enrrique Barfield LPN

## 2022-02-08 ENCOUNTER — OFFICE VISIT (OUTPATIENT)
Dept: FAMILY MEDICINE CLINIC | Age: 52
End: 2022-02-08
Payer: COMMERCIAL

## 2022-02-08 VITALS
BODY MASS INDEX: 24.26 KG/M2 | OXYGEN SATURATION: 99 % | WEIGHT: 145.6 LBS | RESPIRATION RATE: 16 BRPM | HEIGHT: 65 IN | SYSTOLIC BLOOD PRESSURE: 138 MMHG | TEMPERATURE: 97.6 F | DIASTOLIC BLOOD PRESSURE: 88 MMHG | HEART RATE: 49 BPM

## 2022-02-08 DIAGNOSIS — R05.9 COUGH: Primary | ICD-10-CM

## 2022-02-08 DIAGNOSIS — G43.011 INTRACTABLE MIGRAINE WITHOUT AURA AND WITH STATUS MIGRAINOSUS: ICD-10-CM

## 2022-02-08 DIAGNOSIS — H57.89 RED EYE: ICD-10-CM

## 2022-02-08 DIAGNOSIS — J06.9 ACUTE URI: ICD-10-CM

## 2022-02-08 PROCEDURE — 99213 OFFICE O/P EST LOW 20 MIN: CPT | Performed by: NURSE PRACTITIONER

## 2022-02-08 RX ORDER — ZINC GLUCONATE 10 MG
LOZENGE ORAL
COMMUNITY

## 2022-02-08 RX ORDER — AZITHROMYCIN 250 MG/1
TABLET, FILM COATED ORAL
Qty: 6 TABLET | Refills: 0 | Status: SHIPPED | OUTPATIENT
Start: 2022-02-08 | End: 2022-02-13

## 2022-02-08 RX ORDER — OLOPATADINE HYDROCHLORIDE 1 MG/ML
2 SOLUTION/ DROPS OPHTHALMIC 2 TIMES DAILY
Qty: 10 ML | Refills: 0 | Status: SHIPPED | OUTPATIENT
Start: 2022-02-08 | End: 2022-03-02

## 2022-02-08 RX ORDER — BUTALBITAL, ACETAMINOPHEN, CAFFEINE AND CODEINE PHOSPHATE 50; 325; 40; 30 MG/1; MG/1; MG/1; MG/1
1 CAPSULE ORAL
Qty: 15 CAPSULE | Refills: 0 | Status: SHIPPED | OUTPATIENT
Start: 2022-02-08 | End: 2022-02-13

## 2022-02-08 NOTE — PROGRESS NOTES
[address] [facility] Note  Subjective:      Slade Damon is a 46 y.o. female who presents for intermittent burning sensation in right upper lung travelling through her throat, nasal congestion , occasional cough and headache. Mark chills , fever   She has history of migraine but medication is not helping. She also reports right eye redness, x 2 months,  saw eye DR and used eye drops without help. Past Medical History:   Diagnosis Date    Anemia 2011    Borderline Abnormal thyroid function test 2011    Elevated cholesterol with Good HDL     Migraine 2007    Miscarriage     SABx1     (normal spontaneous vaginal delivery)     NSVDx2    Vertigo    History reviewed. No pertinent surgical history. Current Outpatient Medications   Medication Sig Dispense Refill    magnesium 250 mg tab Take  by mouth.  olopatadine (PatanoL) 0.1 % ophthalmic solution Administer 2 Drops to both eyes two (2) times a day. 10 mL 0    azithromycin (ZITHROMAX) 250 mg tablet Take 2 tablets today, then take 1 tablet daily 6 Tablet 0    codeine-butalbital-acetaminophen-caffeine (FIORICET WITH CODEINE) -47-30 mg capsule Take 1 Capsule by mouth every six (6) hours as needed for Headache for up to 5 days. Max Daily Amount: 4 Capsules. 15 Capsule 0    butalbital-acetaminophen-caffeine (FIORICET, ESGIC) -40 mg per tablet Take 1 Tablet by mouth every four (4) hours as needed for Headache. 30 Tablet 0    ferrous sulfate (Slow Release Iron) 143 mg (45 mg iron) TbER TAKE 1 TABLET BY MOUTH EVERY DAY 60 Tablet 0    meclizine (ANTIVERT) 25 mg tablet Take 25 mg by mouth as needed.  cholecalciferol, vitamin D3, (VITAMIN D3 PO) Take  by mouth. Allergies   Allergen Reactions    Zomig [Zolmitriptan] Swelling     Throat stated swelling-heart racing-sx's passed after 30-45 min       ROS:   Complete review of systems was reviewed with pertinent information listed in HPI.   Review of Systems   Constitutional: Negative. HENT: Positive for congestion. Eyes: Positive for redness. Respiratory: Positive for cough. Cardiovascular: Negative. Gastrointestinal: Negative. Genitourinary: Negative. Musculoskeletal: Negative. Neurological: Positive for headaches. Objective:     Visit Vitals  /88 (BP 1 Location: Left upper arm, BP Patient Position: Sitting, BP Cuff Size: Adult)   Pulse (!) 49   Temp 97.6 °F (36.4 °C) (Temporal)   Resp 16   Ht 5' 5\" (1.651 m)   Wt 145 lb 9.6 oz (66 kg)   SpO2 99%   BMI 24.23 kg/m²       Vitals and Nurse Documentation reviewed. Physical Exam  Constitutional:       Appearance: Normal appearance. HENT:      Nose: Congestion present. Eyes:      Comments: Right conjunctivae is slightly red no discharge   Cardiovascular:      Rate and Rhythm: Regular rhythm. Bradycardia present. Pulses: Normal pulses. Heart sounds: Normal heart sounds. No murmur heard. Pulmonary:      Effort: Pulmonary effort is normal.      Breath sounds: Normal breath sounds. Abdominal:      General: Bowel sounds are normal.      Palpations: Abdomen is soft. Musculoskeletal:      Cervical back: Normal range of motion and neck supple. Neurological:      Mental Status: She is alert. Assessment/Plan:     Diagnoses and all orders for this visit:    1. Cough  -     XR CHEST PA LAT; Future    2. Red eye  -     olopatadine (PatanoL) 0.1 % ophthalmic solution; Administer 2 Drops to both eyes two (2) times a day. 3. Acute URI  -     azithromycin (ZITHROMAX) 250 mg tablet; Take 2 tablets today, then take 1 tablet daily    4. Intractable migraine without aura and with status migrainosus  -     codeine-butalbital-acetaminophen-caffeine (FIORICET WITH CODEINE) -78-30 mg capsule; Take 1 Capsule by mouth every six (6) hours as needed for Headache for up to 5 days. Max Daily Amount: 4 Capsules.     await CXR      Pt expressed understanding with the diagnosis and plan        Discussed expected course/resolution/complications of diagnosis in detail with patient.    Medication risks/benefits/costs/interactions/alternatives discussed with patient.    Pt was given an after visit summary which includes diagnoses, current medications & vitals.  Pt expressed understanding with the diagnosis and plan

## 2022-02-08 NOTE — PROGRESS NOTES
Chief Complaint   Patient presents with    Breathing Problem     burning when standing on right side     Red Eye     no irritation, has been red for about a month, right eye       1. \"Have you been to the ER, urgent care clinic since your last visit? Hospitalized since your last visit? \" No    2. \"Have you seen or consulted any other health care providers outside of the [address] [place] [address] since your last visit? \" Yes When: 01/2022 Where: Dr. Antoine Buckley [facility] Reason for visit: annual and mammogram      3. For patients over 45: Has the patient had a colonoscopy? Yes - no Care Gap present     If the patient is female:    4. For patients over 40: Has the patient had a mammogram? Yes - Care Gap present. Rooming MA/LPN to request most recent results    5. For patients over 21: Has the patient had a pap smear?  Yes - no Care Gap present    3 most recent PHQ Screens 2/8/2022   Little interest or pleasure in doing things Not at all   Feeling down, depressed, irritable, or hopeless Not at all   Total Score PHQ 2 0

## 2022-03-02 DIAGNOSIS — H57.89 RED EYE: ICD-10-CM

## 2022-03-02 RX ORDER — OLOPATADINE HYDROCHLORIDE 1 MG/ML
2 SOLUTION/ DROPS OPHTHALMIC 2 TIMES DAILY
Qty: 5 ML | Refills: 1 | Status: SHIPPED | OUTPATIENT
Start: 2022-03-02 | End: 2022-04-14 | Stop reason: ALTCHOICE

## 2022-04-14 ENCOUNTER — NURSE TRIAGE (OUTPATIENT)
Dept: OTHER | Facility: CLINIC | Age: 52
End: 2022-04-14

## 2022-04-14 ENCOUNTER — OFFICE VISIT (OUTPATIENT)
Dept: FAMILY MEDICINE CLINIC | Age: 52
End: 2022-04-14
Payer: COMMERCIAL

## 2022-04-14 VITALS
RESPIRATION RATE: 16 BRPM | HEIGHT: 65 IN | HEART RATE: 55 BPM | WEIGHT: 135 LBS | TEMPERATURE: 97.3 F | OXYGEN SATURATION: 99 % | BODY MASS INDEX: 22.49 KG/M2 | DIASTOLIC BLOOD PRESSURE: 66 MMHG | SYSTOLIC BLOOD PRESSURE: 98 MMHG

## 2022-04-14 DIAGNOSIS — L60.8 TOENAIL DEFORMITY: ICD-10-CM

## 2022-04-14 DIAGNOSIS — L08.9 TOE INFECTION: Primary | ICD-10-CM

## 2022-04-14 PROBLEM — R42 VERTIGO: Status: ACTIVE | Noted: 2022-04-14

## 2022-04-14 PROCEDURE — 99213 OFFICE O/P EST LOW 20 MIN: CPT | Performed by: NURSE PRACTITIONER

## 2022-04-14 RX ORDER — DOXYCYCLINE 100 MG/1
100 CAPSULE ORAL 2 TIMES DAILY
Qty: 20 CAPSULE | Refills: 0 | Status: SHIPPED | OUTPATIENT
Start: 2022-04-14 | End: 2022-04-24

## 2022-04-14 NOTE — PROGRESS NOTES
Chief Complaint   Patient presents with    Toe Pain     middle toe, red, on and off since last month         1. \"Have you been to the ER, urgent care clinic since your last visit? Hospitalized since your last visit? \" No    2. \"Have you seen or consulted any other health care providers outside of the [address] [place] [address] since your last visit? \" No     3. For patients aged 39-70: Has the patient had a colonoscopy / FIT/ Cologuard? Yes - no Care Gap present      If the patient is female:    4. For patients aged 41-77: Has the patient had a mammogram within the past 2 years? Yes - no Care Gap present      5. For patients aged 21-65: Has the patient had a pap smear?  Yes - no Care Gap present         3 most recent PHQ Screens 2/8/2022   Little interest or pleasure in doing things Not at all   Feeling down, depressed, irritable, or hopeless Not at all   Total Score PHQ 2 0       Health Maintenance Due   Topic Date Due    Hepatitis C Screening  Never done    Cervical cancer screen  Never done    Shingrix Vaccine Age 50> (1 of 2) Never done    Breast Cancer Screen Mammogram  Never done    DTaP/Tdap/Td series (2 - Td or Tdap) 01/12/2022

## 2022-04-14 NOTE — TELEPHONE ENCOUNTER
Received call from Surinder Mccuryd at [facility] with Red Flag Complaint. Subjective: Caller states \"Left foot 3rd toe is red and swollen\"     Current Symptoms: left third toe is red and swollen; Is painful; Toenail has look discolored for about a month; Onset: 1 month ago; worsening; increased pain started yesterday;    Associated Symptoms: reduced activity, increased wakefulness    Pain Severity: 7/10; throbbing; constant    Temperature: denies    What has been tried: antifungal on toenail    LMP: december Pregnant: Unknown    Recommended disposition: Go to Office Now    Care advice provided, patient verbalizes understanding; denies any other questions or concerns; instructed to call back for any new or worsening symptoms. Patient/Caller agrees with recommended disposition; writer provided warm transfer to [place] at [facility] for appointment scheduling    Attention Provider: Thank you for allowing me to participate in the care of your patient. The patient was connected to triage in response to information provided to the [facility]. Please do not respond through this encounter as the response is not directed to a shared pool.       Reason for Disposition   SEVERE pain (e.g., excruciating, unable to do any normal activities) and not improved after 2 hours of pain medicine    Protocols used: TOE PAIN-ADULT-[place]

## 2022-04-14 NOTE — PROGRESS NOTES
HISTORY OF PRESENT ILLNESS  Aliya Palmer is a 46 y.o. female. HPI  C/o redness, tenderness and swelling of distal left 3rd toe. Began 2 months ago after wearing cycling shoes. Toenail thickened and discolored. Past medical history, social history, family history and medications were reviewed and updated. Blood pressure 98/66, pulse (!) 55, temperature 97.3 °F (36.3 °C), temperature source Temporal, resp. rate 16, height 5' 5\" (1.651 m), weight 135 lb (61.2 kg), SpO2 99 %. Review of Systems   Constitutional: Negative for chills, fever and malaise/fatigue. Musculoskeletal: Negative for joint pain. Skin:        As stated HPI   All other systems reviewed and are negative. Physical Exam  Constitutional:       General: She is not in acute distress. Cardiovascular:      Rate and Rhythm: Normal rate and regular rhythm. Pulmonary:      Effort: Pulmonary effort is normal.      Breath sounds: Normal breath sounds. Skin:     Comments: Left 3rd toe with erythema and mild swelling at distal toe at nailbed. Toenail thickened with faint tan discoloration. Neurological:      Mental Status: She is alert. ASSESSMENT and PLAN  Diagnoses and all orders for this visit:    1. Toe infection  -     doxycycline (VIBRAMYCIN) 100 mg capsule; Take 1 Capsule by mouth two (2) times a day for 10 days. Avoid constricting footwear. Wash daily with soap and water. 2. Toenail deformity  Recommend podiatry referral for evaluation of toenail. Follow up prn. We discussed the expected course, resolution and complications of the diagnosis in detail. Medication risks, benefits, costs, interactions and side effects were discussed. The patient was advised to contact the office for worsening of condition or FTI as anticipated. The patient expressed understanding of the diagnosis and plan.

## 2023-01-10 ENCOUNTER — OFFICE VISIT (OUTPATIENT)
Dept: FAMILY MEDICINE CLINIC | Age: 53
End: 2023-01-10
Payer: COMMERCIAL

## 2023-01-10 VITALS
HEIGHT: 65 IN | TEMPERATURE: 97.4 F | WEIGHT: 139 LBS | OXYGEN SATURATION: 99 % | RESPIRATION RATE: 16 BRPM | BODY MASS INDEX: 23.16 KG/M2 | HEART RATE: 59 BPM | DIASTOLIC BLOOD PRESSURE: 82 MMHG | SYSTOLIC BLOOD PRESSURE: 129 MMHG

## 2023-01-10 DIAGNOSIS — E55.9 VITAMIN D DEFICIENCY: ICD-10-CM

## 2023-01-10 DIAGNOSIS — Z11.59 NEED FOR HEPATITIS C SCREENING TEST: ICD-10-CM

## 2023-01-10 DIAGNOSIS — Z00.00 GENERAL MEDICAL EXAM: Primary | ICD-10-CM

## 2023-01-10 PROCEDURE — 99396 PREV VISIT EST AGE 40-64: CPT | Performed by: NURSE PRACTITIONER

## 2023-01-10 RX ORDER — VENLAFAXINE 37.5 MG/1
TABLET ORAL
COMMUNITY
Start: 2022-09-08

## 2023-01-10 NOTE — PROGRESS NOTES
Subjective:   45 y.o. female for Well Woman Check. Her gyne and breast care is done elsewhere by her Ob-Gyne physician. Patient Active Problem List   Diagnosis Code    Migraine, unspecified, without mention of intractable migraine without mention of status migrainosus G43.909    Elevated cholesterol with Good HDL E78.00    Vertigo R42     Patient Active Problem List    Diagnosis Date Noted    Vertigo 2022    Elevated cholesterol with Good HDL     Migraine, unspecified, without mention of intractable migraine without mention of status migrainosus 2010     Current Outpatient Medications   Medication Sig Dispense Refill    mv,iron,min-FA-dietary cmb.24 400 mcg tab       venlafaxine (EFFEXOR) 37.5 mg tablet       magnesium 250 mg tab Take  by mouth. butalbital-acetaminophen-caffeine (FIORICET, ESGIC) -40 mg per tablet Take 1 Tablet by mouth every four (4) hours as needed for Headache. 30 Tablet 0    meclizine (ANTIVERT) 25 mg tablet Take 25 mg by mouth as needed. cholecalciferol, vitamin D3, (VITAMIN D3 PO) Take  by mouth. Allergies   Allergen Reactions    Zomig [Zolmitriptan] Swelling     Throat stated swelling-heart racing-sx's passed after 30-45 min     Past Medical History:   Diagnosis Date    Anemia 2011    Borderline Abnormal thyroid function test 2011    Elevated cholesterol with Good HDL     Migraine     Miscarriage     SABx1     (normal spontaneous vaginal delivery)     NSVDx2    Vertigo      History reviewed. No pertinent surgical history.   Family History   Problem Relation Age of Onset    Hypertension Mother         obese s/p lap band surgery    High Cholesterol Father     Heart Attack Father         MI at age 54yo, CABG;  in an MVA    High Cholesterol Sister     Cancer Paternal Grandmother         ?intestinal CA in her [de-identified]    Heart Disease Paternal Grandmother         \"aneurysm\" in the brain she thinks    Cancer Paternal Grandfather         throat CA, heavy smoker, ?63's    Thyroid Disease Sister         hypothyroidism    Seizures Daughter         1 seizure at 20mo old    Breast Cancer Paternal Aunt      Social History     Tobacco Use    Smoking status: Never    Smokeless tobacco: Never   Substance Use Topics    Alcohol use: Yes     Comment: 2-3 times/week about 1-2 glasses of wine      Specific concerns today: None    Review of Systems  A comprehensive review of systems was negative except for that written in the HPI. Objective:   Blood pressure 129/82, pulse (!) 59, temperature 97.4 °F (36.3 °C), temperature source Temporal, resp. rate 16, height 5' 5\" (1.651 m), weight 139 lb (63 kg), SpO2 99 %. Physical Examination:   General appearance - alert, well appearing, and in no distress  Mental status - alert, oriented to person, place, and time  Eyes - pupils equal and reactive, extraocular eye movements intact  Ears - bilateral TM's and external ear canals normal  Nose - normal and patent, no erythema, discharge or polyps  Mouth - mucous membranes moist, pharynx normal without lesions  Neck - supple, no significant adenopathy  Lymphatics - no palpable lymphadenopathy, no hepatosplenomegaly  Chest - clear to auscultation, no wheezes, rales or rhonchi, symmetric air entry  Heart - normal rate, regular rhythm, normal S1, S2, no murmurs, rubs, clicks or gallops  Abdomen - soft, nontender, nondistended, no masses or organomegaly  Back exam - full range of motion, no tenderness, palpable spasm or pain on motion  Neurological - alert, oriented, normal speech, no focal findings or movement disorder noted  Musculoskeletal - no joint tenderness, deformity or swelling  Extremities - peripheral pulses normal, no pedal edema, no clubbing or cyanosis  Skin - normal coloration and turgor, no rashes, no suspicious skin lesions noted     Assessment/Plan:   continue present plan, routine labs ordered  Diagnoses and all orders for this visit:    1.  General medical exam  - LIPID PANEL; Future  -     METABOLIC PANEL, COMPREHENSIVE; Future  -     CBC W/O DIFF; Future  -     TSH 3RD GENERATION; Future  -     T4, FREE; Future    2. Vitamin D deficiency  -     VITAMIN D, 25 HYDROXY; Future    3. Need for hepatitis C screening test  -     HEPATITIS C AB;  Future

## 2023-01-10 NOTE — PROGRESS NOTES
Chief Complaint   Patient presents with    Labs         1. \"Have you been to the ER, urgent care clinic since your last visit? Hospitalized since your last visit? \" No    2. \"Have you seen or consulted any other health care providers outside of the [address] [place] [address] since your last visit? \" No     3. For patients aged 39-70: Has the patient had a colonoscopy / FIT/ Cologuard? Yes - no Care Gap present      If the patient is female:    4. For patients aged 41-77: Has the patient had a mammogram within the past 2 years? Yes - no Care Gap present      5. For patients aged 21-65: Has the patient had a pap smear?  Yes - no Care Gap present         3 most recent PHQ Screens 1/10/2023   Little interest or pleasure in doing things Not at all   Feeling down, depressed, irritable, or hopeless Not at all   Total Score PHQ 2 0       Health Maintenance Due   Topic Date Due    Hepatitis C Screening  Never done    Shingles Vaccine (1 of 2) Never done    DTaP/Tdap/Td series (2 - Td or Tdap) 01/12/2022    COVID-19 Vaccine (4 - Booster for Espinosa Peter series) 01/18/2022    Flu Vaccine (1) 08/01/2022

## 2023-01-11 DIAGNOSIS — E03.9 ACQUIRED HYPOTHYROIDISM: Primary | ICD-10-CM

## 2023-01-11 DIAGNOSIS — E78.5 HYPERLIPIDEMIA, UNSPECIFIED HYPERLIPIDEMIA TYPE: ICD-10-CM

## 2023-01-11 LAB
25(OH)D3+25(OH)D2 SERPL-MCNC: 48.9 NG/ML (ref 30–100)
ALBUMIN SERPL-MCNC: 4.6 G/DL (ref 3.8–4.9)
ALBUMIN/GLOB SERPL: 2 {RATIO} (ref 1.2–2.2)
ALP SERPL-CCNC: 65 IU/L (ref 44–121)
ALT SERPL-CCNC: 15 IU/L (ref 0–32)
AST SERPL-CCNC: 23 IU/L (ref 0–40)
BILIRUB SERPL-MCNC: <0.2 MG/DL (ref 0–1.2)
BUN SERPL-MCNC: 15 MG/DL (ref 6–24)
BUN/CREAT SERPL: 17 (ref 9–23)
CALCIUM SERPL-MCNC: 9.6 MG/DL (ref 8.7–10.2)
CHLORIDE SERPL-SCNC: 101 MMOL/L (ref 96–106)
CHOLEST SERPL-MCNC: 313 MG/DL (ref 100–199)
CO2 SERPL-SCNC: 24 MMOL/L (ref 20–29)
COMMENT:: ABNORMAL
CREAT SERPL-MCNC: 0.87 MG/DL (ref 0.57–1)
EGFR: 79 ML/MIN/1.73
ERYTHROCYTE [DISTWIDTH] IN BLOOD BY AUTOMATED COUNT: 12.8 % (ref 11.7–15.4)
GLOBULIN SER CALC-MCNC: 2.3 G/DL (ref 1.5–4.5)
GLUCOSE SERPL-MCNC: 82 MG/DL (ref 70–99)
HCT VFR BLD AUTO: 38.7 % (ref 34–46.6)
HCV AB S/CO SERPL IA: <0.1 S/CO RATIO (ref 0–0.9)
HDLC SERPL-MCNC: 99 MG/DL
HGB BLD-MCNC: 13.4 G/DL (ref 11.1–15.9)
IMP & REVIEW OF LAB RESULTS: NORMAL
LDLC SERPL CALC-MCNC: 197 MG/DL (ref 0–99)
MCH RBC QN AUTO: 30.5 PG (ref 26.6–33)
MCHC RBC AUTO-ENTMCNC: 34.6 G/DL (ref 31.5–35.7)
MCV RBC AUTO: 88 FL (ref 79–97)
PLATELET # BLD AUTO: 224 X10E3/UL (ref 150–450)
POTASSIUM SERPL-SCNC: 4 MMOL/L (ref 3.5–5.2)
PROT SERPL-MCNC: 6.9 G/DL (ref 6–8.5)
RBC # BLD AUTO: 4.4 X10E6/UL (ref 3.77–5.28)
SODIUM SERPL-SCNC: 141 MMOL/L (ref 134–144)
T4 FREE SERPL-MCNC: 1 NG/DL (ref 0.82–1.77)
TRIGL SERPL-MCNC: 103 MG/DL (ref 0–149)
TSH SERPL DL<=0.005 MIU/L-ACNC: 6.48 UIU/ML (ref 0.45–4.5)
VLDLC SERPL CALC-MCNC: 17 MG/DL (ref 5–40)
WBC # BLD AUTO: 5.3 X10E3/UL (ref 3.4–10.8)

## 2023-01-11 RX ORDER — ROSUVASTATIN CALCIUM 5 MG/1
5 TABLET, COATED ORAL
Qty: 90 TABLET | Refills: 0 | Status: SHIPPED | OUTPATIENT
Start: 2023-01-11

## 2023-01-11 RX ORDER — LEVOTHYROXINE SODIUM 50 UG/1
50 TABLET ORAL
Qty: 90 TABLET | Refills: 0 | Status: SHIPPED | OUTPATIENT
Start: 2023-01-11

## 2023-04-04 ENCOUNTER — PATIENT MESSAGE (OUTPATIENT)
Dept: FAMILY MEDICINE CLINIC | Age: 53
End: 2023-04-04

## 2023-04-04 RX ORDER — ROSUVASTATIN CALCIUM 5 MG/1
5 TABLET, COATED ORAL
Qty: 30 TABLET | Refills: 0 | Status: SHIPPED
Start: 2023-04-04

## 2023-04-04 RX ORDER — LEVOTHYROXINE SODIUM 50 UG/1
50 TABLET ORAL
Qty: 30 TABLET | Refills: 0 | Status: SHIPPED
Start: 2023-04-04

## 2023-04-04 NOTE — TELEPHONE ENCOUNTER
----- Message from [facility] sent at 4/4/2023  9:29 AM EDT -----  Regarding: Refill Prescriptions  Good morning,  I understand that Claudene Presume is no longer with [facility]. She put my on thyroid and cholesterol medication in January, and it is time for me to refill them. I tried to get a follow-up blood test several weeks ago (per her recommendation in January), but nobody got back to me. I'd rather not pay for another appointment in order to get the blood test for cholesterol and thyroid. So, two requests (1) can you please refill my prescriptions, and (2) can you put through the order for the follow-up blood test.  Thank you!   [facility]  [phone]

## 2023-04-04 NOTE — TELEPHONE ENCOUNTER
Patient called stating that she is in need of a refill of this medication, as she is almost out. She is hoping we can refill this for her as she needs it. She has made an appointment on 4/19 with ANITA Cardoza that she is able to come in for. She I hoping to get at least a dosage to least her till them that way she does not stop this medication. Patient would like a call back for when this is possible.     Best call back number  [phone]

## 2023-04-04 NOTE — TELEPHONE ENCOUNTER
Outbound call to patient. Name and  verified, let patient know that she would need to schedule an appointment with an available provider so that she can continue care and have medications refilled when needed. Patient understood.

## 2023-04-04 NOTE — TELEPHONE ENCOUNTER
PCP: Nikia Pena NP    Last appt: 1/10/2023  Future Appointments   Date Time Provider Sree Schneider   4/19/2023 10:30 AM Abdulaziz Roblero NP [facility] AMB       Requested Prescriptions     Pending Prescriptions Disp Refills    rosuvastatin (CRESTOR) 5 mg tablet 30 Tablet 0     Sig: Take 1 Tablet by mouth nightly. levothyroxine (SYNTHROID) 50 mcg tablet 30 Tablet 0     Sig: Take 1 Tablet by mouth Daily (before breakfast). Prior labs and Blood pressures:  BP Readings from Last 3 Encounters:   01/10/23 129/82   04/14/22 98/66   02/08/22 138/88     Lab Results   Component Value Date/Time    Sodium 141 01/10/2023 12:00 AM    Potassium 4.0 01/10/2023 12:00 AM    Chloride 101 01/10/2023 12:00 AM    CO2 24 01/10/2023 12:00 AM    Anion gap 2 (L) 07/09/2021 09:11 PM    Glucose 82 01/10/2023 12:00 AM    BUN 15 01/10/2023 12:00 AM    Creatinine 0.87 01/10/2023 12:00 AM    BUN/Creatinine ratio 17 01/10/2023 12:00 AM    GFR est AA >60 07/09/2021 09:11 PM    GFR est non-AA >60 07/09/2021 09:11 PM    Calcium 9.6 01/10/2023 12:00 AM     No results found for: HBA1C, NGL0VYLG, BDA8GLMO  Lab Results   Component Value Date/Time    Cholesterol, total 313 (H) 01/10/2023 12:00 AM    HDL Cholesterol 99 01/10/2023 12:00 AM    LDL, calculated 197 (H) 01/10/2023 12:00 AM    LDL, calculated 158 (H) 02/17/2017 10:26 AM    VLDL, calculated 17 01/10/2023 12:00 AM    VLDL, calculated 14 02/17/2017 10:26 AM    Triglyceride 103 01/10/2023 12:00 AM    CHOL/HDL Ratio 2.6 04/15/2010 08:16 AM     Lab Results   Component Value Date/Time    Vitamin D 25-Hydroxy 32.6 01/21/2011 08:31 AM    VITAMIN D, 25-HYDROXY 48.9 01/10/2023 12:00 AM       Lab Results   Component Value Date/Time    TSH 6.480 (H) 01/10/2023 12:00 AM       Just need enough until OV.

## 2023-04-19 ENCOUNTER — OFFICE VISIT (OUTPATIENT)
Dept: FAMILY MEDICINE CLINIC | Age: 53
End: 2023-04-19

## 2023-04-19 VITALS
WEIGHT: 137.8 LBS | SYSTOLIC BLOOD PRESSURE: 124 MMHG | OXYGEN SATURATION: 100 % | HEIGHT: 65 IN | DIASTOLIC BLOOD PRESSURE: 82 MMHG | RESPIRATION RATE: 16 BRPM | HEART RATE: 49 BPM | TEMPERATURE: 97.5 F | BODY MASS INDEX: 22.96 KG/M2

## 2023-04-19 DIAGNOSIS — E78.2 MIXED HYPERLIPIDEMIA: ICD-10-CM

## 2023-04-19 DIAGNOSIS — Z76.89 ENCOUNTER TO ESTABLISH CARE: Primary | ICD-10-CM

## 2023-04-19 DIAGNOSIS — E03.8 SUBCLINICAL HYPOTHYROIDISM: ICD-10-CM

## 2023-04-19 DIAGNOSIS — Z23 ENCOUNTER FOR IMMUNIZATION: ICD-10-CM

## 2023-04-19 DIAGNOSIS — G43.909 MIGRAINE WITHOUT STATUS MIGRAINOSUS, NOT INTRACTABLE, UNSPECIFIED MIGRAINE TYPE: ICD-10-CM

## 2023-04-19 RX ORDER — SUMATRIPTAN 50 MG/1
50 TABLET, FILM COATED ORAL
Qty: 5 TABLET | Refills: 0 | Status: SHIPPED | OUTPATIENT
Start: 2023-04-19 | End: 2023-04-19

## 2023-04-19 RX ORDER — ZOSTER VACCINE RECOMBINANT, ADJUVANTED 50 MCG/0.5
0.5 KIT INTRAMUSCULAR ONCE
Qty: 0.5 ML | Refills: 0 | Status: SHIPPED | OUTPATIENT
Start: 2023-04-19 | End: 2023-04-19

## 2023-04-19 RX ORDER — BUTALBITAL, ACETAMINOPHEN, CAFFEINE AND CODEINE PHOSPHATE 50; 325; 40; 30 MG/1; MG/1; MG/1; MG/1
1 CAPSULE ORAL
Qty: 15 CAPSULE | Refills: 0 | Status: SHIPPED | OUTPATIENT
Start: 2023-04-19 | End: 2023-04-22

## 2023-04-19 NOTE — PROGRESS NOTES
[address] [facility] Note     Emy Jorge (: 1970) is a 45 y.o. female, established patient, here for evaluation of the following chief complaint(s):  Establish Care       ASSESSMENT/PLAN:  1. Encounter to establish care    2. Subclinical hypothyroidism  -     TSH 3RD GENERATION; Future  -     T4, FREE; Future  -     THYROID ANTIBODY PANEL; Future  -We discussed potential ultrasound of the thyroid. Patient will consider this. -Medication will be adjusted upon review of updated labs    3. Mixed hyperlipidemia  -     LIPID PANEL; Future  - Continue crestor  -Diet and lifestyle modification encouraged for weight loss and chronic disease prevention/ management    4. Encounter for immunization  -     Fang Blew, (AGE 10 YRS+), IM  -     THER/PROPH/DIAG INJECTION, SUBCUT/IM    5. Migraine without status migrainosus, not intractable, unspecified migraine type  -     REFERRAL TO NEUROLOGY  -     SUMAtriptan (IMITREX) 50 mg tablet; Take 1 Tablet by mouth once as needed for Migraine for up to 1 dose., Normal, Disp-5 Tablet, R-0  -     codeine-butalbital-acetaminophen-caffeine (FIORICET WITH CODEINE) -72-30 mg capsule; Take 1 Capsule by mouth every six (6) hours as needed for Headache for up to 3 days. Max Daily Amount: 4 Capsules. , Normal, Disp-15 Capsule, R-0        Return if symptoms worsen or fail to improve, for Regular Follow-up. SUBJECTIVE/OBJECTIVE:    Emy Jorge is a 45 y.o. female seen today to establish care with new provider as her previous PCP is no longer with the practice. Patient shares that she follows ayurvedic lifestyle and medicine approach to her health. Thyroid:  hypothyroidism and subclinical hypothyroidism well controlled, stable, asymptomatic. Headache / Migraine:  Pt has had headaches for years. Description of pain: throbbing pain, dull pain, unilateral in the left occipital area. Duration of individual headaches: up to 3 days.  frequency several times per month. Associated symptoms: nausea and vomiting. Pain relief: lying in a darkened room and fioricet w/ codiene . Precipitating factors: none. She denies a history of recent head injury. Prior neurological history: negative for stroke, brain neoplasms, seizure disorders, multiple sclerosis. REVIEW OF SYSTEMS:    Review of Systems      VITAL SIGNS:    Wt Readings from Last 3 Encounters:   04/19/23 137 lb 12.8 oz (62.5 kg)   01/10/23 139 lb (63 kg)   04/14/22 135 lb (61.2 kg)     Temp Readings from Last 3 Encounters:   04/19/23 97.5 °F (36.4 °C) (Temporal)   01/10/23 97.4 °F (36.3 °C) (Temporal)   04/14/22 97.3 °F (36.3 °C) (Temporal)     BP Readings from Last 3 Encounters:   04/19/23 124/82   01/10/23 129/82   04/14/22 98/66     Pulse Readings from Last 3 Encounters:   04/19/23 (!) 49   01/10/23 (!) 59   04/14/22 (!) 55           PHYSICAL EXAMINATION:       General: Alert, cooperative, no distress  Respiratory: Breathing comfortably, in no acute respiratory distress. Clear to auscultation bilaterally. Cardiovascular: Regular rate and rhythm, S1, S2 normal, no murmur, click, rub or gallop. Extremities: no edema. Abdomen: Soft, non-tender, not distended. Bowel sounds normal. No masses or organomegaly. MSK: Extremities normal appearing, atraumatic, no effusion. Gait steady and unassisted. Skin: Skin color, texture, turgor normal. No rashes or lesions on exposed skin. Neurologic: A/Ox3  Psychiatric: Normal affect. Mood euthymic. Thoughts logical. Speech volume and speed normal            Treatment risks/benefits/costs/interactions/alternatives discussed with patient. Advised patient to call back or return to office if symptoms worsen/change/persist. If patient cannot reach us or should anything more severe/urgent arise he/she should proceed directly to the nearest emergency department. Discussed expected course/resolution/complications of diagnosis in detail with patient.   Patient expressed understanding with the diagnosis and plan. An electronic signature was used to authenticate this note.   -- Suraj Lewis NP

## 2023-04-19 NOTE — PROGRESS NOTES
Chief Complaint   Patient presents with    Establish Care         1. \"Have you been to the ER, urgent care clinic since your last visit? Hospitalized since your last visit? \" No    2. \"Have you seen or consulted any other health care providers outside of the [address] [place] [address] since your last visit? \" No     3. For patients over 39: Has the patient had a colorectal cancer screening? Yes - no Care Gap present     If the patient is female:    4. For patients over 40: Has the patient had a mammogram? Yes - no Care Gap present    5. For patients over 21: Has the patient had a pap smear?  Yes - no Care Gap present     3 most recent PHQ Screens 1/10/2023   Little interest or pleasure in doing things Not at all   Feeling down, depressed, irritable, or hopeless Not at all   Total Score PHQ 2 0       Health Maintenance Due   Topic Date Due    Shingles Vaccine (1 of 2) Never done    COVID-19 Vaccine (4 - Booster for Pfizer series) 01/18/2022

## 2023-04-21 ENCOUNTER — APPOINTMENT (OUTPATIENT)
Dept: FAMILY MEDICINE CLINIC | Age: 53
End: 2023-04-21

## 2023-04-22 LAB
CHOLEST SERPL-MCNC: 191 MG/DL (ref 100–199)
HDLC SERPL-MCNC: 83 MG/DL
IMP & REVIEW OF LAB RESULTS: NORMAL
LDLC SERPL CALC-MCNC: 98 MG/DL (ref 0–99)
T4 FREE SERPL-MCNC: 1.33 NG/DL (ref 0.82–1.77)
THYROGLOB AB SERPL-ACNC: 137.2 IU/ML (ref 0–0.9)
THYROPEROXIDASE AB SERPL-ACNC: 66 IU/ML (ref 0–34)
TRIGL SERPL-MCNC: 53 MG/DL (ref 0–149)
TSH SERPL DL<=0.005 MIU/L-ACNC: 2.53 UIU/ML (ref 0.45–4.5)
VLDLC SERPL CALC-MCNC: 10 MG/DL (ref 5–40)

## 2023-05-02 DIAGNOSIS — E78.5 HYPERLIPIDEMIA, UNSPECIFIED HYPERLIPIDEMIA TYPE: ICD-10-CM

## 2023-05-02 DIAGNOSIS — E03.9 ACQUIRED HYPOTHYROIDISM: ICD-10-CM

## 2023-05-02 RX ORDER — LEVOTHYROXINE SODIUM 50 UG/1
TABLET ORAL
Qty: 30 TABLET | Refills: 0 | Status: SHIPPED | OUTPATIENT
Start: 2023-05-02

## 2023-05-02 RX ORDER — ROSUVASTATIN CALCIUM 5 MG/1
5 TABLET, COATED ORAL
Qty: 30 TABLET | Refills: 0 | Status: SHIPPED | OUTPATIENT
Start: 2023-05-02

## 2023-05-26 DIAGNOSIS — E78.5 HYPERLIPIDEMIA, UNSPECIFIED: ICD-10-CM

## 2023-05-26 DIAGNOSIS — E03.9 HYPOTHYROIDISM, UNSPECIFIED: ICD-10-CM

## 2023-05-26 RX ORDER — ROSUVASTATIN CALCIUM 5 MG/1
TABLET, COATED ORAL
Qty: 90 TABLET | Refills: 1 | Status: SHIPPED | OUTPATIENT
Start: 2023-05-26

## 2023-05-26 RX ORDER — LEVOTHYROXINE SODIUM 0.05 MG/1
TABLET ORAL
Qty: 90 TABLET | Refills: 1 | Status: SHIPPED | OUTPATIENT
Start: 2023-05-26

## 2023-07-10 ENCOUNTER — TELEPHONE (OUTPATIENT)
Age: 53
End: 2023-07-10

## 2023-07-10 ENCOUNTER — NURSE TRIAGE (OUTPATIENT)
Dept: OTHER | Facility: CLINIC | Age: 53
End: 2023-07-10

## 2023-07-10 NOTE — TELEPHONE ENCOUNTER
Location of patient: [address] [place] [address] [place] call from Bo at [employer] with [employer]. Subjective: Caller states \"I have vertigo\"     Current Symptoms: Vertigo on and off the last couple of years. Seeing neurologist in November for migraines. Vertigo is getting worse and happening more often. Slight dizziness at time of call. Onset: several years ago; worsening the past week. Associated Symptoms: reduced activity    What has been tried: laying down    LMP: NA Pregnant: NA    Recommended disposition: Go to [facility]    Care advice provided, patient verbalizes understanding; denies any other questions or concerns; instructed to call back for any new or worsening symptoms. Patient/Caller agrees with recommended disposition; writer provided warm transfer to [facility] at [employer] for appointment scheduling    Attention Provider: Thank you for allowing me to participate in the care of your patient. The patient was connected to triage in response to information provided to the ECC/PSC. Please do not respond through this encounter as the response is not directed to a shared pool.     Reason for Disposition   Lightheadedness (dizziness) present now, after 2 hours of rest and fluids    Protocols used: Dizziness-ADULT-[place]

## 2023-07-10 NOTE — TELEPHONE ENCOUNTER
Spoke to patient, two ID confirmed. Patient cont. To c/o Vertigo x 1 week. It comes and goes. Today it is very mild, but it has been is going on for  a week. Last week it was worse. Mild Headache with Nausea . Yesterday and Tuesday was the worse cause her to be sick on her stomach.

## 2023-07-10 NOTE — TELEPHONE ENCOUNTER
Patient triage complaining of vertigo started last Thursday and now it is up and down. No available appointment would like for the nurse to call.     Best call back #[phone]

## 2023-07-11 SDOH — ECONOMIC STABILITY: FOOD INSECURITY: WITHIN THE PAST 12 MONTHS, THE FOOD YOU BOUGHT JUST DIDN'T LAST AND YOU DIDN'T HAVE MONEY TO GET MORE.: NEVER TRUE

## 2023-07-11 SDOH — ECONOMIC STABILITY: TRANSPORTATION INSECURITY
IN THE PAST 12 MONTHS, HAS LACK OF TRANSPORTATION KEPT YOU FROM MEETINGS, WORK, OR FROM GETTING THINGS NEEDED FOR DAILY LIVING?: NO

## 2023-07-11 SDOH — ECONOMIC STABILITY: HOUSING INSECURITY
IN THE LAST 12 MONTHS, WAS THERE A TIME WHEN YOU DID NOT HAVE A STEADY PLACE TO SLEEP OR SLEPT IN A SHELTER (INCLUDING NOW)?: NO

## 2023-07-11 SDOH — ECONOMIC STABILITY: FOOD INSECURITY: WITHIN THE PAST 12 MONTHS, YOU WORRIED THAT YOUR FOOD WOULD RUN OUT BEFORE YOU GOT MONEY TO BUY MORE.: NEVER TRUE

## 2023-07-11 SDOH — ECONOMIC STABILITY: INCOME INSECURITY: HOW HARD IS IT FOR YOU TO PAY FOR THE VERY BASICS LIKE FOOD, HOUSING, MEDICAL CARE, AND HEATING?: NOT HARD AT ALL

## 2023-07-12 ENCOUNTER — OFFICE VISIT (OUTPATIENT)
Age: 53
End: 2023-07-12
Payer: COMMERCIAL

## 2023-07-12 VITALS
BODY MASS INDEX: 23.09 KG/M2 | RESPIRATION RATE: 18 BRPM | TEMPERATURE: 97 F | HEART RATE: 60 BPM | DIASTOLIC BLOOD PRESSURE: 95 MMHG | HEIGHT: 65 IN | WEIGHT: 138.6 LBS | OXYGEN SATURATION: 98 % | SYSTOLIC BLOOD PRESSURE: 150 MMHG

## 2023-07-12 DIAGNOSIS — R42 VERTIGO: Primary | ICD-10-CM

## 2023-07-12 PROCEDURE — 99215 OFFICE O/P EST HI 40 MIN: CPT | Performed by: STUDENT IN AN ORGANIZED HEALTH CARE EDUCATION/TRAINING PROGRAM

## 2023-07-12 RX ORDER — BUTALBITAL, ACETAMINOPHEN, CAFFEINE AND CODEINE PHOSPHATE 50; 325; 40; 30 MG/1; MG/1; MG/1; MG/1
CAPSULE ORAL
COMMUNITY
Start: 2023-04-19

## 2023-07-12 RX ORDER — MULTIVITAMIN WITH IRON
TABLET ORAL
COMMUNITY

## 2023-07-12 RX ORDER — SUMATRIPTAN 50 MG/1
TABLET, FILM COATED ORAL
COMMUNITY
Start: 2023-04-19

## 2023-07-12 NOTE — PROGRESS NOTES
Kyra Cassidy  45 y.o. female  1970  [address]  [id number]     [facility]       Chief Complaint: vertigo  Source: self, the medical record     Subjective  Kyra Cassidy is an 45 y.o. female who presents for recurrent vertgo. Has been a longstanding issue since 11/2020 - presents with document detailing hx including seeing ENT and reponse to Epleys in past     Was feeling awful 2 days ago and states this episode started July 3rd after a nap on the couch, in bed all day the following day and had an episode of emesis then was feeling a bit better during the week with intermittent dizziness and nausea. Was vomiting and with dirrhea when returning from beach trip over the weekend but now back on path of feeling improvement over the last 48hours. No longer with routine spinning, still feeling rightheaded. She took meclizine once - is hestant to take it as makes her tired. Took benadryl overnight. Has neurology appt for migraine 12/4/23    Also concerned for sinus pressure and suspects this may be contributing to the symptoms of vertigo. ROS  Negative except what is mentioned in HPI      Allergies - reviewed:    Allergies   Allergen Reactions    Zolmitriptan Swelling     Throat stated swelling-heart racing-sx's passed after 30-45 min         Medications - reviewed:   Current Outpatient Medications   Medication Sig    Cholecalciferol 1.25 MG (27335 UT) TABS Take by mouth    magnesium (MAGNESIUM-OXIDE) 250 MG TABS tablet Take by mouth    butalbital-acetaminophen-caffeine-codeine (FIORICET WITH CODEINE) -97-30 MG per capsule PLEASE SEE ATTACHED FOR DETAILED DIRECTIONS    SUMAtriptan (IMITREX) 50 MG tablet TAKE 1 TABLET BY MOUTH ONCE AS NEEDED FOR MIGRAINE FOR UP TO 1 DOSE.    rosuvastatin (CRESTOR) 5 MG tablet TAKE 1 TABLET BY MOUTH NIGHTLY    levothyroxine (SYNTHROID) 50 MCG tablet TAKE 1 TABLET BY MOUTH EVERY DAY BEFORE BREAKFAST    meclizine

## 2023-07-12 NOTE — PROGRESS NOTES
Chief Complaint   Patient presents with    Dizziness       1. \"Have you been to the ER, urgent care clinic since your last visit? Hospitalized since your last visit? \" no  2. \"Have you seen or consulted any other health care providers outside of the [address] [place] [address] since your last visit? \" no  3. For patients aged 43-73: Has the patient had a colonoscopy / FIT/ Cologuard? Yes - Care Gap present. Most recent result on file      If the patient is female:    4. For patients aged 43-66: Has the patient had a mammogram within the past 2 years? Yes - Care Gap present. Most recent result on file      5. For patients aged 21-65: Has the patient had a pap smear? Yes - Care Gap present. Most recent result on file       Financial Resource Strain: Low Risk     Difficulty of Paying Living Expenses: Not hard at all      Food Insecurity: No Food Insecurity    Worried About [place] in the Last Year: Never true    [address] in the Last Year: Never true        No flowsheet data found. No flowsheet data found.      PHQ-9  1/10/2023   Little interest or pleasure in doing things 0   Little interest or pleasure in doing things -   Feeling down, depressed, or hopeless 0   PHQ-2 Score 0   Total Score PHQ 2 -   PHQ-9 Total Score 0       Health Maintenance Due   Topic Date Due    HIV screen  Never done    Shingles vaccine (1 of 2) Never done    COVID-19 Vaccine (4 - Booster for Pfizer series) 01/18/2022

## 2023-07-12 NOTE — PATIENT INSTRUCTIONS
Nikhil Allan  [address] [facility], [address] [place]  [phone]    Check out [facility] for vestibular PT evaluation

## 2023-11-23 DIAGNOSIS — E03.9 HYPOTHYROIDISM, UNSPECIFIED: ICD-10-CM

## 2023-11-23 DIAGNOSIS — E78.5 HYPERLIPIDEMIA, UNSPECIFIED: ICD-10-CM

## 2023-11-24 RX ORDER — LEVOTHYROXINE SODIUM 0.05 MG/1
TABLET ORAL
Qty: 90 TABLET | Refills: 1 | Status: SHIPPED | OUTPATIENT
Start: 2023-11-24

## 2023-11-24 RX ORDER — ROSUVASTATIN CALCIUM 5 MG/1
TABLET, COATED ORAL
Qty: 90 TABLET | Refills: 1 | Status: SHIPPED | OUTPATIENT
Start: 2023-11-24

## 2023-11-24 NOTE — TELEPHONE ENCOUNTER
PCP: Tree Ruth APRN - NP    Last appt: 7/12/2023   Future Appointments   Date Time Provider [address]   12/4/2023  8:00 AM DO MARLENE Ray AMB       Requested Prescriptions     Pending Prescriptions Disp Refills    levothyroxine (SYNTHROID) 50 MCG tablet [Pharmacy Med Name: LEVOTHYROXINE 50 MCG TABLET] 90 tablet 1     Sig: TAKE 1 TABLET BY MOUTH EVERY DAY BEFORE BREAKFAST    rosuvastatin (CRESTOR) 5 MG tablet [Pharmacy Med Name: ROSUVASTATIN CALCIUM 5 MG TAB] 90 tablet 1     Sig: TAKE 1 TABLET BY MOUTH NIGHTLY         Prior labs and Blood pressures:  BP Readings from Last 3 Encounters:   07/12/23 (!) 150/95   01/10/23 129/82   04/14/22 98/66     Lab Results   Component Value Date/Time     01/10/2023 12:00 AM    K 4.0 01/10/2023 12:00 AM     01/10/2023 12:00 AM    CO2 24 01/10/2023 12:00 AM    BUN 15 01/10/2023 12:00 AM    GFRAA >60 07/09/2021 09:11 PM     No results found for: \"HBA1C\", \"SRQ9JCSI\"  Lab Results   Component Value Date/Time    CHOL 191 04/21/2023 12:00 AM    HDL 83 04/21/2023 12:00 AM    VLDL 10 04/21/2023 12:00 AM     No results found for: \"VITD3\", \"VD3RIA\"    Lab Results   Component Value Date/Time    TSH 2.530 04/21/2023 12:00 AM

## 2023-12-04 ENCOUNTER — OFFICE VISIT (OUTPATIENT)
Age: 53
End: 2023-12-04
Payer: COMMERCIAL

## 2023-12-04 VITALS
HEART RATE: 56 BPM | OXYGEN SATURATION: 99 % | DIASTOLIC BLOOD PRESSURE: 60 MMHG | BODY MASS INDEX: 22.99 KG/M2 | WEIGHT: 138 LBS | HEIGHT: 65 IN | SYSTOLIC BLOOD PRESSURE: 118 MMHG

## 2023-12-04 DIAGNOSIS — G43.009 MIGRAINE WITHOUT AURA AND WITHOUT STATUS MIGRAINOSUS, NOT INTRACTABLE: Primary | ICD-10-CM

## 2023-12-04 DIAGNOSIS — H81.10 BENIGN PAROXYSMAL POSITIONAL VERTIGO, UNSPECIFIED LATERALITY: ICD-10-CM

## 2023-12-04 PROCEDURE — 99244 OFF/OP CNSLTJ NEW/EST MOD 40: CPT | Performed by: PSYCHIATRY & NEUROLOGY

## 2023-12-04 NOTE — PROGRESS NOTES
lesions. Neurological:  Mental Status:  Alert and oriented to person, place, and time with fluent speech. Cranial Nerves:   CNII/III/IV/VI: Optic disc w/clear margins b/l, visual fields full to confrontation, EOMI, PERRL, no ptosis or nystagmus. CN V: Facial sensation intact bilaterally, masseter 5/5   CN VII: Facial muscles symmetric and strong   CN VIII: Hears finger rub well bilaterally, intact vestibular function   CN IX/X: Normal palatal movement   CN XI: Full strength shoulder shrug bilaterally   CN XII: Tongue protrusion full and midline without fasciculation or atrophy  Motor: Normal tone and muscle bulk with no pronator drift. No atrophy or fasciculations present on examination. Individual muscle group testing:  Shoulder abduction:   Left:5/5   Right : 5/5    Shoulder adduction:   Left:5/5   Right : 5/5    Elbow flexion:      Left:5/5   Right : 5/5  Elbow extension:    Left:5/5   Right : 5/5   Wrist flexion:     Left:5/5   Right : 5/5  Wrist extension:    Left:5/5   Right : 5/5  Arm pronation:   Left:5/5   Right : 5/5  Arm supination:   Left:5/5   Right : 5/5    Finger flexion:    Left:5/5   Right : 5/5    Finger extension:   Left:5/5   Right : 5/5   Finger abduction:  Left:5/5   Right : 5/5   Finger adduction:   Left:5/5   Right : 5/5  Hip flexion:     Left:5/5   Right : 5/5         Hip extension:   Left:5/5   Right : 5/5    Knee flexion:    Left:5/5   Right : 5/5    Knee extension:   Left:5/5   Right : 5/5    Dorsiflexion:     Left:5/5   Right : 5/5  Plantar flexion:    Left:5/5   Right : 5/5       MSRs: No crossed adductors or clonus. RIGHT  LEFT   Brachioradialis 2+ 2+   Biceps 2+ 2+   Triceps 2+ 2+   Knee 2+ 2+   Achilles 2+ 2+        Plantar response Downward Downward          Sensation: Normal and symmetric perception of pinprick, temperature, light touch, proprioception, and vibration; Coordination: No dysmetria.  Normal rapid alternating movements; finger-to-nose and heel-to- shin

## 2024-03-13 ENCOUNTER — OFFICE VISIT (OUTPATIENT)
Age: 54
End: 2024-03-13
Payer: COMMERCIAL

## 2024-03-13 VITALS
HEART RATE: 60 BPM | RESPIRATION RATE: 16 BRPM | TEMPERATURE: 97.8 F | BODY MASS INDEX: 22.79 KG/M2 | HEIGHT: 65 IN | OXYGEN SATURATION: 99 % | WEIGHT: 136.8 LBS | DIASTOLIC BLOOD PRESSURE: 80 MMHG | SYSTOLIC BLOOD PRESSURE: 134 MMHG

## 2024-03-13 DIAGNOSIS — Z00.00 ENCOUNTER FOR WELL ADULT EXAM WITHOUT ABNORMAL FINDINGS: Primary | ICD-10-CM

## 2024-03-13 DIAGNOSIS — D50.8 IRON DEFICIENCY ANEMIA SECONDARY TO INADEQUATE DIETARY IRON INTAKE: ICD-10-CM

## 2024-03-13 DIAGNOSIS — E55.9 HYPOVITAMINOSIS D: ICD-10-CM

## 2024-03-13 PROCEDURE — 99396 PREV VISIT EST AGE 40-64: CPT | Performed by: NURSE PRACTITIONER

## 2024-03-13 RX ORDER — SUMATRIPTAN 50 MG/1
TABLET, FILM COATED ORAL
Qty: 9 TABLET | Refills: 2 | Status: SHIPPED | OUTPATIENT
Start: 2024-03-13

## 2024-03-13 ASSESSMENT — PATIENT HEALTH QUESTIONNAIRE - PHQ9
SUM OF ALL RESPONSES TO PHQ QUESTIONS 1-9: 0
SUM OF ALL RESPONSES TO PHQ9 QUESTIONS 1 & 2: 0
SUM OF ALL RESPONSES TO PHQ QUESTIONS 1-9: 0
1. LITTLE INTEREST OR PLEASURE IN DOING THINGS: 0
SUM OF ALL RESPONSES TO PHQ QUESTIONS 1-9: 0
2. FEELING DOWN, DEPRESSED OR HOPELESS: 0
SUM OF ALL RESPONSES TO PHQ QUESTIONS 1-9: 0

## 2024-03-13 NOTE — PROGRESS NOTES
Chief Complaint   Patient presents with    Annual Exam       \"Have you been to the ER, urgent care clinic since your last visit?  Hospitalized since your last visit?\"    NO    “Have you seen or consulted any other health care providers outside of [facility] since your last visit?”    NO             3/13/2024     1:00 PM   PHQ-9    Little interest or pleasure in doing things 0   Feeling down, depressed, or hopeless 0   PHQ-2 Score 0   PHQ-9 Total Score 0       Health Maintenance Due   Topic Date Due    Hepatitis B vaccine (1 of 3 - 3-dose series) Never done    HIV screen  Never done    Shingles vaccine (2 of 2) 06/18/2023    Flu vaccine (1) 08/01/2023    COVID-19 Vaccine (4 - 2023-24 season) 09/01/2023    Depression Screen  01/10/2024     
Elevated cholesterol     Hearing loss 03-    Hypothyroidism 01-    Migraine     Miscarriage     SABx1     (normal spontaneous vaginal delivery)     NSVDx2    Vertigo        History reviewed. No pertinent surgical history.      Family History   Problem Relation Age of Onset    Seizures Daughter         1 seizure at 18mo old    High Cholesterol Sister     Cancer Paternal Grandfather         throat CA, heavy smoker, ?60's    Heart Disease Paternal Grandmother         \"aneurysm\" in the brain she thinks    Cancer Paternal Grandmother         ?intestinal CA in her 80's    Hypertension Mother         obese s/p lap band surgery    Atrial Fibrillation Mother     Depression Mother     Heart Disease Mother     High Blood Pressure Mother     Obesity Mother     Breast Cancer Paternal Aunt     Heart Attack Father         MI at age 49yo, CABG;  in an MVA    High Cholesterol Father     Gout Father     Hearing Loss Father     Heart Disease Father     Thyroid Disease Sister         hypothyroidism       Social History     Tobacco Use    Smoking status: Never    Smokeless tobacco: Never   Vaping Use    Vaping Use: Never used   Substance Use Topics    Alcohol use: Yes     Alcohol/week: 3.0 standard drinks of alcohol     Types: 3 Glasses of wine per week    Drug use: No       Objective     Vital Signs  /80   Pulse 60   Temp 97.8 °F (36.6 °C) (Infrared)   Resp 16   Ht 1.651 m (5' 5\")   Wt 62.1 kg (136 lb 12.8 oz)   SpO2 99%   BMI 22.76 kg/m²   Wt Readings from Last 3 Encounters:   24 62.1 kg (136 lb 12.8 oz)   23 62.6 kg (138 lb)   23 62.9 kg (138 lb 9.6 oz)       Waist Circumference  There were no vitals filed for this visit.    Physical Exam  Vitals and nursing note reviewed.   Constitutional:       General: She is not in acute distress.     Appearance: Normal appearance. She is not toxic-appearing.   HENT:      Head: Normocephalic and atraumatic.      Right Ear: Tympanic membrane

## 2024-03-14 ENCOUNTER — NURSE ONLY (OUTPATIENT)
Age: 54
End: 2024-03-14

## 2024-03-14 DIAGNOSIS — E55.9 HYPOVITAMINOSIS D: ICD-10-CM

## 2024-03-14 DIAGNOSIS — Z00.00 ENCOUNTER FOR WELL ADULT EXAM WITHOUT ABNORMAL FINDINGS: ICD-10-CM

## 2024-03-14 DIAGNOSIS — D50.8 IRON DEFICIENCY ANEMIA SECONDARY TO INADEQUATE DIETARY IRON INTAKE: ICD-10-CM

## 2024-03-15 LAB
25(OH)D3 SERPL-MCNC: 44.3 NG/ML (ref 30–100)
ALBUMIN SERPL-MCNC: 4.2 G/DL (ref 3.5–5)
ALBUMIN/GLOB SERPL: 1.4 (ref 1.1–2.2)
ALP SERPL-CCNC: 62 U/L (ref 45–117)
ALT SERPL-CCNC: 27 U/L (ref 12–78)
ANION GAP SERPL CALC-SCNC: 7 MMOL/L (ref 5–15)
AST SERPL-CCNC: 21 U/L (ref 15–37)
BASOPHILS # BLD: 0 K/UL (ref 0–0.1)
BASOPHILS NFR BLD: 1 % (ref 0–1)
BILIRUB SERPL-MCNC: 0.3 MG/DL (ref 0.2–1)
BUN SERPL-MCNC: 10 MG/DL (ref 6–20)
BUN/CREAT SERPL: 11 (ref 12–20)
CALCIUM SERPL-MCNC: 9.5 MG/DL (ref 8.5–10.1)
CHLORIDE SERPL-SCNC: 104 MMOL/L (ref 97–108)
CHOLEST SERPL-MCNC: 206 MG/DL
CO2 SERPL-SCNC: 28 MMOL/L (ref 21–32)
CREAT SERPL-MCNC: 0.94 MG/DL (ref 0.55–1.02)
DIFFERENTIAL METHOD BLD: NORMAL
EOSINOPHIL # BLD: 0.2 K/UL (ref 0–0.4)
EOSINOPHIL NFR BLD: 3 % (ref 0–7)
ERYTHROCYTE [DISTWIDTH] IN BLOOD BY AUTOMATED COUNT: 12.2 % (ref 11.5–14.5)
FERRITIN SERPL-MCNC: 82 NG/ML (ref 26–388)
GLOBULIN SER CALC-MCNC: 2.9 G/DL (ref 2–4)
GLUCOSE SERPL-MCNC: 80 MG/DL (ref 65–100)
HCT VFR BLD AUTO: 40.3 % (ref 35–47)
HDLC SERPL-MCNC: 90 MG/DL
HDLC SERPL: 2.3 (ref 0–5)
HGB BLD-MCNC: 13.2 G/DL (ref 11.5–16)
IMM GRANULOCYTES # BLD AUTO: 0 K/UL (ref 0–0.04)
IMM GRANULOCYTES NFR BLD AUTO: 0 % (ref 0–0.5)
IRON SATN MFR SERPL: 26 % (ref 20–50)
IRON SERPL-MCNC: 86 UG/DL (ref 35–150)
LDLC SERPL CALC-MCNC: 94 MG/DL (ref 0–100)
LYMPHOCYTES # BLD: 1.8 K/UL (ref 0.8–3.5)
LYMPHOCYTES NFR BLD: 31 % (ref 12–49)
MCH RBC QN AUTO: 29.7 PG (ref 26–34)
MCHC RBC AUTO-ENTMCNC: 32.8 G/DL (ref 30–36.5)
MCV RBC AUTO: 90.8 FL (ref 80–99)
MONOCYTES # BLD: 0.4 K/UL (ref 0–1)
MONOCYTES NFR BLD: 7 % (ref 5–13)
NEUTS SEG # BLD: 3.3 K/UL (ref 1.8–8)
NEUTS SEG NFR BLD: 58 % (ref 32–75)
NRBC # BLD: 0 K/UL (ref 0–0.01)
NRBC BLD-RTO: 0 PER 100 WBC
PLATELET # BLD AUTO: 206 K/UL (ref 150–400)
PMV BLD AUTO: 8.9 FL (ref 8.9–12.9)
POTASSIUM SERPL-SCNC: 4.3 MMOL/L (ref 3.5–5.1)
PROT SERPL-MCNC: 7.1 G/DL (ref 6.4–8.2)
RBC # BLD AUTO: 4.44 M/UL (ref 3.8–5.2)
SODIUM SERPL-SCNC: 139 MMOL/L (ref 136–145)
T4 FREE SERPL-MCNC: 1.2 NG/DL (ref 0.8–1.5)
TIBC SERPL-MCNC: 325 UG/DL (ref 250–450)
TRIGL SERPL-MCNC: 110 MG/DL
TSH SERPL DL<=0.05 MIU/L-ACNC: 3.56 UIU/ML (ref 0.36–3.74)
VLDLC SERPL CALC-MCNC: 22 MG/DL
WBC # BLD AUTO: 5.7 K/UL (ref 3.6–11)

## 2024-04-19 ENCOUNTER — OFFICE VISIT (OUTPATIENT)
Age: 54
End: 2024-04-19
Payer: COMMERCIAL

## 2024-04-19 VITALS
BODY MASS INDEX: 22.49 KG/M2 | HEART RATE: 56 BPM | OXYGEN SATURATION: 99 % | WEIGHT: 135 LBS | SYSTOLIC BLOOD PRESSURE: 122 MMHG | HEIGHT: 65 IN | DIASTOLIC BLOOD PRESSURE: 70 MMHG

## 2024-04-19 DIAGNOSIS — H81.10 BENIGN PAROXYSMAL POSITIONAL VERTIGO, UNSPECIFIED LATERALITY: ICD-10-CM

## 2024-04-19 DIAGNOSIS — G43.009 MIGRAINE WITHOUT AURA AND WITHOUT STATUS MIGRAINOSUS, NOT INTRACTABLE: Primary | ICD-10-CM

## 2024-04-19 PROCEDURE — 99214 OFFICE O/P EST MOD 30 MIN: CPT | Performed by: PSYCHIATRY & NEUROLOGY

## 2024-04-19 RX ORDER — SUMATRIPTAN 100 MG/1
100 TABLET, FILM COATED ORAL PRN
Qty: 9 TABLET | Refills: 5 | Status: SHIPPED | OUTPATIENT
Start: 2024-04-19

## 2024-04-19 RX ORDER — MECLIZINE HCL 12.5 MG/1
12.5 TABLET ORAL 3 TIMES DAILY PRN
Qty: 30 TABLET | Refills: 0 | Status: SHIPPED | OUTPATIENT
Start: 2024-04-19 | End: 2024-04-29

## 2024-04-19 NOTE — PROGRESS NOTES
previous visit.   Past Medical History:   Diagnosis Date    Abnormal thyroid function test 2011    Anemia 2011    Elevated cholesterol     Hearing loss 03-    Hypothyroidism 01-    Migraine 2007    Miscarriage     SABx1     (normal spontaneous vaginal delivery)     NSVDx2    Vertigo        ROS:  Comprehensive review of systems negative except for as noted above.       Objective:       Meds:  Current Outpatient Medications   Medication Sig Dispense Refill    SUMAtriptan (IMITREX) 50 MG tablet TAKE 1 TABLET BY MOUTH ONCE AS NEEDED FOR MIGRAINE FOR UP TO 1 DOSE. 9 tablet 2    NONFORMULARY Estroven, migreleif and iron and drakshadi      levothyroxine (SYNTHROID) 50 MCG tablet TAKE 1 TABLET BY MOUTH EVERY DAY BEFORE BREAKFAST 90 tablet 1    rosuvastatin (CRESTOR) 5 MG tablet TAKE 1 TABLET BY MOUTH NIGHTLY 90 tablet 1    Cholecalciferol 1.25 MG (98409 UT) TABS Take 2,000 Units by mouth      venlafaxine (EFFEXOR) 37.5 MG tablet Take 1 tablet by mouth daily ceived the following from [facility]: Outside name: venlafaxine (EFFEXOR) 37.5 mg tablet       No current facility-administered medications for this visit.       Exam:  /70   Pulse 56   Ht 1.638 m (5' 4.5\")   Wt 61.2 kg (135 lb)   SpO2 99%   BMI 22.81 kg/m²   NEUROLOGICAL EXAM:  General: Awake, alert, speech fluent  CN: PERRL, EOMI without nystagmus, VFF to confrontation, facial sensation and strength are normal and symmetric, hearing is intact to finger rub bilaterally, palate and tongue movements are intact and symmetric.  Motor: Normal tone, bulk and strength bilaterally.  Reflexes: 2/4 and symmetric, plantar stimulation is flexor.  Coordination: FNF, KELLEE, HTS intact.  Sensation: LT intact throughout.  Gait: Normal-based and steady.      LABS  Nurse Only on 2024   Component Date Value    Vit D, 25-Hydroxy 2024 44.3     Iron 2024 86     TIBC 2024 325     Iron % Saturation 2024 26

## 2024-05-28 DIAGNOSIS — E78.5 HYPERLIPIDEMIA, UNSPECIFIED: ICD-10-CM

## 2024-05-28 DIAGNOSIS — E03.9 HYPOTHYROIDISM, UNSPECIFIED: ICD-10-CM

## 2024-05-28 RX ORDER — LEVOTHYROXINE SODIUM 0.05 MG/1
TABLET ORAL
Qty: 90 TABLET | Refills: 1 | Status: SHIPPED | OUTPATIENT
Start: 2024-05-28

## 2024-05-28 RX ORDER — ROSUVASTATIN CALCIUM 5 MG/1
5 TABLET, COATED ORAL NIGHTLY
Qty: 90 TABLET | Refills: 1 | Status: SHIPPED | OUTPATIENT
Start: 2024-05-28

## 2024-05-28 NOTE — TELEPHONE ENCOUNTER
PCP: Loretta Beltran, APRN - NP    Last appt: 3/13/2024   Future Appointments   Date Time Provider Department Center   11/8/2024  9:00 AM Connie Trujillo, DO [facility] AM[facility]       Requested Prescriptions     Pending Prescriptions Disp Refills    rosuvastatin (CRESTOR) 5 MG tablet [Pharmacy Med Name: ROSUVASTATIN CALCIUM 5 MG TAB] 90 tablet 1     Sig: TAKE 1 TABLET BY MOUTH EVERY DAY AT NIGHT    levothyroxine (SYNTHROID) 50 MCG tablet [Pharmacy Med Name: LEVOTHYROXINE 50 MCG TABLET] 90 tablet 1     Sig: TAKE 1 TABLET BY MOUTH EVERY DAY BEFORE BREAKFAST         Prior labs and Blood pressures:  BP Readings from Last 3 Encounters:   04/19/24 122/70   03/13/24 134/80   12/04/23 118/60     Lab Results   Component Value Date/Time     03/14/2024 09:38 AM    K 4.3 03/14/2024 09:38 AM     03/14/2024 09:38 AM    CO2 28 03/14/2024 09:38 AM    BUN 10 03/14/2024 09:38 AM    GFRAA >60 07/09/2021 09:11 PM     No results found for: \"HBA1C\", \"[id number]\"  Lab Results   Component Value Date/Time    CHOL 206 03/14/2024 09:38 AM    HDL 90 03/14/2024 09:38 AM    LDL 94 03/14/2024 09:38 AM    VLDL 22 03/14/2024 09:38 AM    VLDL 10 04/21/2023 12:00 AM     No results found for: \"VITD3\"    Lab Results   Component Value Date/Time    TSH 2.530 04/21/2023 12:00 AM

## 2024-11-19 DIAGNOSIS — E78.5 HYPERLIPIDEMIA, UNSPECIFIED: ICD-10-CM

## 2024-11-19 RX ORDER — ROSUVASTATIN CALCIUM 5 MG/1
5 TABLET, COATED ORAL NIGHTLY
Qty: 90 TABLET | Refills: 1 | Status: SHIPPED | OUTPATIENT
Start: 2024-11-19

## 2024-11-19 NOTE — TELEPHONE ENCOUNTER
PCP: Loretta Beltran, APRN - NP    Last appt: 3/13/2024   Future Appointments   Date Time Provider Department Center   12/4/2024  9:30 AM Cecelia Daniel APRN - NP [facility] AM[facility]       Requested Prescriptions     Pending Prescriptions Disp Refills    rosuvastatin (CRESTOR) 5 MG tablet [Pharmacy Med Name: ROSUVASTATIN CALCIUM 5 MG TAB] 90 tablet 1     Sig: TAKE 1 TABLET BY MOUTH EVERY DAY AT NIGHT         Prior labs and Blood pressures:  BP Readings from Last 3 Encounters:   04/19/24 122/70   03/13/24 134/80   12/04/23 118/60     Lab Results   Component Value Date/Time     03/14/2024 09:38 AM    K 4.3 03/14/2024 09:38 AM     03/14/2024 09:38 AM    CO2 28 03/14/2024 09:38 AM    BUN 10 03/14/2024 09:38 AM    GFRAA >60 07/09/2021 09:11 PM     No results found for: \"HBA1C\", \"[id number]\"  Lab Results   Component Value Date/Time    CHOL 206 03/14/2024 09:38 AM    HDL 90 03/14/2024 09:38 AM    LDL 94 03/14/2024 09:38 AM    VLDL 22 03/14/2024 09:38 AM    VLDL 10 04/21/2023 12:00 AM     No results found for: \"VITD3\"    Lab Results   Component Value Date/Time    TSH 3.56 03/14/2024 09:38 AM

## 2025-01-15 NOTE — PROGRESS NOTES
neck pain causing migraines, gives example of lifting weight over her head causing migraine in the interim. She is using Migrelief for headache prevention and uses Nurtec qd prn for migraine rescue. PCP prescribed Nurtec, approved, and helpful for migraine relief without side effects. She recently had two weeks when she did not have a migraine. Imitrex is also very helpful for migraine rescue, but causes side effect of sedation. Typically uses Nurtec first. She also has vertigo intermittently, seems to be triggered by high intensity work outs. Meclizine 12.5 mg TID PRN is helpful for vertigo. She is curious about starting vestibular therapy.     Past Medical History:   Diagnosis Date    Abnormal thyroid function test 2011    Anemia 2011    Elevated cholesterol     Hearing loss 03-    Hypothyroidism 01-    Migraine 2007    Miscarriage     SABx1     (normal spontaneous vaginal delivery)     NSVDx2    Vertigo      Allergies   Allergen Reactions    Zolmitriptan Swelling     Throat stated swelling-heart racing-sx's passed after 30-45 min         Current Outpatient Medications   Medication Sig    rosuvastatin (CRESTOR) 5 MG tablet TAKE 1 TABLET BY MOUTH EVERY DAY AT NIGHT    levothyroxine (SYNTHROID) 50 MCG tablet TAKE 1 TABLET BY MOUTH EVERY DAY BEFORE BREAKFAST    SUMAtriptan (IMITREX) 100 MG tablet Take 1 tablet by mouth as needed for Migraine    NONFORMULARY Estroven, migreleif and iron and drakshadi    Cholecalciferol 1.25 MG (39833 UT) TABS Take 2,000 Units by mouth    venlafaxine (EFFEXOR) 37.5 MG tablet Take 1 tablet by mouth daily ceived the following from [facility]: Outside name: venlafaxine (EFFEXOR) 37.5 mg tablet     No current facility-administered medications for this visit.       Review of Systems   Eyes:  Positive for visual disturbance.   Musculoskeletal:  Positive for neck pain.   Neurological:  Positive for headaches. Negative for tremors, seizures,

## 2025-01-16 ENCOUNTER — CLINICAL DOCUMENTATION (OUTPATIENT)
Age: 55
End: 2025-01-16

## 2025-01-16 ENCOUNTER — OFFICE VISIT (OUTPATIENT)
Age: 55
End: 2025-01-16
Payer: COMMERCIAL

## 2025-01-16 VITALS
DIASTOLIC BLOOD PRESSURE: 72 MMHG | BODY MASS INDEX: 22.49 KG/M2 | HEIGHT: 65 IN | HEART RATE: 66 BPM | RESPIRATION RATE: 16 BRPM | WEIGHT: 135 LBS | OXYGEN SATURATION: 99 % | SYSTOLIC BLOOD PRESSURE: 108 MMHG

## 2025-01-16 DIAGNOSIS — H81.10 BENIGN PAROXYSMAL POSITIONAL VERTIGO, UNSPECIFIED LATERALITY: ICD-10-CM

## 2025-01-16 DIAGNOSIS — G43.009 MIGRAINE WITHOUT AURA AND WITHOUT STATUS MIGRAINOSUS, NOT INTRACTABLE: Primary | ICD-10-CM

## 2025-01-16 PROCEDURE — 99203 OFFICE O/P NEW LOW 30 MIN: CPT

## 2025-01-16 RX ORDER — RIMEGEPANT SULFATE 75 MG/75MG
75 TABLET, ORALLY DISINTEGRATING ORAL AS NEEDED
COMMUNITY
Start: 2024-11-01

## 2025-01-16 RX ORDER — ONDANSETRON 4 MG/1
TABLET, ORALLY DISINTEGRATING ORAL
COMMUNITY
Start: 2024-12-18

## 2025-01-16 ASSESSMENT — PATIENT HEALTH QUESTIONNAIRE - PHQ9
SUM OF ALL RESPONSES TO PHQ QUESTIONS 1-9: 0
2. FEELING DOWN, DEPRESSED OR HOPELESS: NOT AT ALL
1. LITTLE INTEREST OR PLEASURE IN DOING THINGS: NOT AT ALL
SUM OF ALL RESPONSES TO PHQ QUESTIONS 1-9: 0
SUM OF ALL RESPONSES TO PHQ9 QUESTIONS 1 & 2: 0

## 2025-03-06 ENCOUNTER — TRANSCRIBE ORDERS (OUTPATIENT)
Facility: HOSPITAL | Age: 55
End: 2025-03-06

## 2025-03-06 DIAGNOSIS — E78.2 MIXED HYPERLIPIDEMIA: ICD-10-CM

## 2025-03-06 DIAGNOSIS — Z82.49 FAMILY HISTORY OF ISCHEMIC HEART DISEASE AND OTHER DISEASES OF THE CIRCULATORY SYSTEM: Primary | ICD-10-CM

## 2025-04-03 ENCOUNTER — HOSPITAL ENCOUNTER (OUTPATIENT)
Facility: HOSPITAL | Age: 55
Discharge: HOME | End: 2025-04-03
Attending: FAMILY MEDICINE

## 2025-04-03 DIAGNOSIS — Z82.49 FAMILY HISTORY OF ISCHEMIC HEART DISEASE AND OTHER DISEASES OF THE CIRCULATORY SYSTEM: ICD-10-CM

## 2025-04-03 DIAGNOSIS — E78.2 MIXED HYPERLIPIDEMIA: ICD-10-CM

## 2025-04-03 PROCEDURE — 75571 CT HRT W/O DYE W/CA TEST: CPT

## 2025-05-11 DIAGNOSIS — G43.009 MIGRAINE WITHOUT AURA AND WITHOUT STATUS MIGRAINOSUS, NOT INTRACTABLE: ICD-10-CM

## 2025-05-12 RX ORDER — SUMATRIPTAN SUCCINATE 100 MG/1
100 TABLET ORAL PRN
Qty: 9 TABLET | Refills: 1 | Status: SHIPPED | OUTPATIENT
Start: 2025-05-12

## 2025-05-31 DIAGNOSIS — E78.5 HYPERLIPIDEMIA, UNSPECIFIED: ICD-10-CM

## 2025-06-03 RX ORDER — ROSUVASTATIN CALCIUM 5 MG/1
5 TABLET, COATED ORAL NIGHTLY
Qty: 30 TABLET | Refills: 0 | Status: SHIPPED | OUTPATIENT
Start: 2025-06-03

## 2025-06-03 NOTE — TELEPHONE ENCOUNTER
PCP: Brittny Lei MD    Last appt: 3/13/2024     Future Appointments   Date Time Provider Department Center   7/28/2025  8:30 AM Cecelia Daniel, APRN - NP [facility]       Requested Prescriptions     Pending Prescriptions Disp Refills    rosuvastatin (CRESTOR) 5 MG tablet [Pharmacy Med Name: ROSUVASTATIN CALCIUM 5 MG TAB] 90 tablet 1     Sig: TAKE 1 TABLET BY MOUTH EVERY DAY AT NIGHT       Prior labs and Blood pressures:  BP Readings from Last 3 Encounters:   01/16/25 108/72   04/19/24 122/70   03/13/24 134/80     Lab Results   Component Value Date/Time     03/14/2024 09:38 AM    K 4.3 03/14/2024 09:38 AM     03/14/2024 09:38 AM    CO2 28 03/14/2024 09:38 AM    BUN 10 03/14/2024 09:38 AM    GFRAA >60 07/09/2021 09:11 PM     No results found for: \"HBA1C\", \"BBD0TVDO\"  Lab Results   Component Value Date/Time    CHOL 206 03/14/2024 09:38 AM    HDL 90 03/14/2024 09:38 AM    LDL 94 03/14/2024 09:38 AM    VLDL 22 03/14/2024 09:38 AM    VLDL 10 04/21/2023 12:00 AM     No results found for: \"VITD3\"    Lab Results   Component Value Date/Time    TSH 3.56 03/14/2024 09:38 AM

## 2025-06-05 NOTE — TELEPHONE ENCOUNTER
Gave pt a call in regards to scheduling her for a office visit, pt stated that she has changed her pcp.  06/05/2025

## 2025-07-16 ENCOUNTER — TRANSCRIBE ORDERS (OUTPATIENT)
Facility: HOSPITAL | Age: 55
End: 2025-07-16

## 2025-07-16 DIAGNOSIS — M79.89 OTHER SPECIFIED SOFT TISSUE DISORDERS: Primary | ICD-10-CM

## 2025-08-22 ENCOUNTER — HOSPITAL ENCOUNTER (OUTPATIENT)
Age: 55
Discharge: HOME | End: 2025-08-25

## 2025-08-22 DIAGNOSIS — M79.89 OTHER SPECIFIED SOFT TISSUE DISORDERS: ICD-10-CM
